# Patient Record
Sex: FEMALE | Race: WHITE | Employment: UNEMPLOYED | URBAN - METROPOLITAN AREA
[De-identification: names, ages, dates, MRNs, and addresses within clinical notes are randomized per-mention and may not be internally consistent; named-entity substitution may affect disease eponyms.]

---

## 2019-06-23 ENCOUNTER — HOSPITAL ENCOUNTER (EMERGENCY)
Age: 34
Discharge: HOME | End: 2019-06-23
Attending: EMERGENCY MEDICINE
Payer: MEDICAID

## 2019-06-23 VITALS
WEIGHT: 230.6 LBS | OXYGEN SATURATION: 100 % | BODY MASS INDEX: 39.37 KG/M2 | HEIGHT: 64 IN | SYSTOLIC BLOOD PRESSURE: 134 MMHG | HEART RATE: 109 BPM | RESPIRATION RATE: 16 BRPM | DIASTOLIC BLOOD PRESSURE: 84 MMHG | TEMPERATURE: 98.5 F

## 2019-06-23 DIAGNOSIS — J02.0 STREP PHARYNGITIS: Primary | ICD-10-CM

## 2019-06-23 LAB — S PYO AG THROAT QL: POSITIVE

## 2019-06-23 PROCEDURE — 99283 EMERGENCY DEPT VISIT LOW MDM: CPT

## 2019-06-23 PROCEDURE — 87880 STREP A ASSAY W/OPTIC: CPT

## 2019-06-23 RX ORDER — AMOXICILLIN 500 MG/1
500 CAPSULE ORAL 3 TIMES DAILY
Qty: 30 CAPSULE | Refills: 0 | Status: SHIPPED | OUTPATIENT
Start: 2019-06-23 | End: 2019-07-03

## 2019-06-23 RX ORDER — PAROXETINE HYDROCHLORIDE 20 MG/1
20 TABLET, FILM COATED ORAL EVERY MORNING
COMMUNITY

## 2019-06-23 RX ORDER — MELOXICAM 7.5 MG/1
7.5 TABLET ORAL 2 TIMES DAILY
COMMUNITY

## 2019-06-23 SDOH — HEALTH STABILITY: MENTAL HEALTH: HOW OFTEN DO YOU HAVE A DRINK CONTAINING ALCOHOL?: NEVER

## 2019-06-23 ASSESSMENT — PAIN SCALES - GENERAL
PAINLEVEL_OUTOF10: 9
PAINLEVEL_OUTOF10: 5

## 2019-06-23 ASSESSMENT — PAIN DESCRIPTION - FREQUENCY: FREQUENCY: CONTINUOUS

## 2019-06-23 ASSESSMENT — PAIN - FUNCTIONAL ASSESSMENT
PAIN_FUNCTIONAL_ASSESSMENT: PREVENTS OR INTERFERES SOME ACTIVE ACTIVITIES AND ADLS
PAIN_FUNCTIONAL_ASSESSMENT: 0-10

## 2019-06-23 ASSESSMENT — PAIN DESCRIPTION - ONSET: ONSET: GRADUAL

## 2019-06-23 ASSESSMENT — PAIN DESCRIPTION - LOCATION: LOCATION: THROAT

## 2019-06-23 ASSESSMENT — PAIN DESCRIPTION - PAIN TYPE: TYPE: ACUTE PAIN

## 2019-06-23 ASSESSMENT — PAIN DESCRIPTION - DESCRIPTORS: DESCRIPTORS: BURNING

## 2019-06-23 ASSESSMENT — PAIN DESCRIPTION - PROGRESSION: CLINICAL_PROGRESSION: GRADUALLY WORSENING

## 2019-06-23 NOTE — ED TRIAGE NOTES
pt presents to ED with a 2 day hx of pharyngitis \"burning\" rated as 9/10/goal is 5/10; pain exacerbated by swallowing/+ cough productive of yellowish phlegm. + left submandibular lympadenopathy palpated. + throat erythema with exudate noted.

## 2019-06-23 NOTE — ED PROVIDER NOTES
[address] [facility]  eMERGENCY dEPARTMENT eNCOUnter      Pt Name: Dai Gill  MRN: [id number]  [place] 1985  Date of evaluation: 6/23/2019  Provider: Dorenda Crigler, [address] [place] [address]       Chief Complaint   Patient presents with    Pharyngitis     pt presents to ED with a 2 day hx of pharyngitis \"burning\" rated as 9/10/goal is 5/10; pain exacerbated by swallowing/+ cough productive of yellowish phlegm         HISTORY OF PRESENT ILLNESS   (Location/Symptom, Timing/Onset, Context/Setting, Quality, Duration, Modifying Factors, Severity)  Note limiting factors. Dai Gill is a 30 y.o. female who presents to the emergency department with complaint of a sore throat that began 2 days ago. She complains of pain with swallowing and a possible fever. She is had a slight cough with some yellow drainage. She denies any headache or neck pain. No chest pain or shortness of breath. No vomiting or diarrhea. No travel history. No exposure to illness. HPI    Nursing Notes were reviewed. REVIEW OF SYSTEMS    (2-9 systems for level 4, 10 or more for level 5)         Eyes: Negative for redness or drainage. Respiratory: Negative for shortness of breath or dyspnea on exertion. Cardiovascular: Negative for chest pain. Gastrointestinal: Negative for abdominal pain. Negative for vomiting or diarrhea. Genitourinary: Negative for flank pain. Negative for dysuria. Negative for hematuria. Neurological: Negative for headache. Musculoskeletal:  Negative edema. All systems are reviewed and are negative except for those listed above in the history of present illness and ROS. PAST MEDICAL HISTORY     Past Medical History:   Diagnosis Date    Anxiety     Arthritis     Depression     OCD (obsessive compulsive disorder)          SURGICAL HISTORY     History reviewed. No pertinent surgical history.       CURRENT MEDICATIONS       Discharge Medication List as of 6/23/2019 distress. Head: No visible evidence of trauma. Normocephalic. Eyes: Pupils equal and reactive. No photophobia. Conjunctiva normal.    HENT: Oral mucosa moist.  Airway patent. Pharynx revealed erythema to the posterior pharynx and anterior Palatino arch. Tonsils were hypertrophied bilaterally but symmetrical.  No abscess. Uvula midline. No exudates noted to both tonsillar pillars. No trismus. Floor the mouth was normal.  Neck:  Soft and supple. No meningeal signs. No lymphadenopathy. Heart:  Regular rate and rhythm. No murmur. Lungs:  Clear to auscultation. No wheezes, rales, or ronchi. No conversational dyspnea or accessory muscle use. Abdomen:  Soft, nondistended, bowel sounds present. Nontender. No guarding rigidity or rebound. No masses. Musculoskeletal: Extremities non-tender with full range of motion. Neurological: Alert and oriented x 3. Speech clear. Cranial nerves II-XII intact. No facial droop. No acute focal motor or sensory deficits. Skin: Skin is warm and dry. No rash. Psychiatric: Normal mood and affect.  Behavior is normal.         DIAGNOSTIC RESULTS     EKG: All EKG's are interpreted by the Emergency Department Physician who either signs or Co-signs this chart in the absence of a cardiologist.        RADIOLOGY:   Non-plain film images such as CT, Ultrasound and MRI are read by the radiologist. Plain radiographic images are visualized and preliminarily interpreted by the emergency physician with the below findings:        Interpretation per the Radiologist below, if available at the time of this note:    No orders to display         ED BEDSIDE ULTRASOUND:   Performed by ED Physician - none    LABS:  Labs Reviewed   STREP SCREEN GROUP A THROAT - Abnormal; Notable for the following components:       Result Value    Rapid Strep A Screen POSITIVE (*)     All other components within normal limits    Narrative:     Performed at:  [address] [facility]  [address] [place], [facility]   Phone [phone]       All other labs were within normal range or not returned as of this dictation. EMERGENCY DEPARTMENT COURSE and DIFFERENTIAL DIAGNOSIS/MDM:   Vitals:    Vitals:    06/23/19 1021 06/23/19 1028   BP: 134/84    Pulse: 109    Resp: 16    Temp: 98.5 °F (36.9 °C)    TempSrc: Oral    SpO2:  100%   Weight: 230 lb 9.6 oz (104.6 kg)    Height: 5' 4\" (1.626 m)          The patient presented with a sore throat as noted above. Strep screen was positive for strep. Clinical exam is consistent with strep pharyngitis and she will be treated with amoxicillin 500 mg 3 times daily for 10 days. She was advised to drink plenty of fluids. She is nontoxic. She is stable for outpatient management. She is able to drink liquids. If her condition worsens or new symptoms develop, she was advised to return immediately to the emergency department. MDM      REASSESSMENT            CRITICAL CARE TIME   Total Critical Care time was 0 minutes, excluding separately reportable procedures. There was a high probability of clinically significant/life threatening deterioration in the patient's condition which required my urgent intervention. CONSULTS:  None    PROCEDURES:  Unless otherwise noted below, none     Procedures        FINAL IMPRESSION      1.  Strep pharyngitis          DISPOSITION/PLAN   DISPOSITION Decision To Discharge 06/23/2019 11:10:17 AM      PATIENT REFERRED TO:  Liliya Armstrong MD  [address] [address]  [phone]    Call today      MD [address] [address]  [phone]    Call         DISCHARGE MEDICATIONS:  Discharge Medication List as of 6/23/2019 11:23 AM      START taking these medications    Details   amoxicillin (AMOXIL) 500 MG capsule Take 1 capsule by mouth 3 times daily for 10 days, Disp-30 capsule, R-0Print           Controlled Substances Monitoring:     No

## 2019-06-23 NOTE — ED NOTES
Discharge and education instructions reviewed. Patient verbalized understanding, teach-back successful. Patient denied questions at this time. No acute distress noted. Patient instructed to follow-up as noted - return to emergency department if symptoms worsen. Patient verbalized understanding. Discharged per EDMD with discharge instructions.          Fortino Rothman RN  06/23/19 4825

## 2023-01-12 LAB
ALBUMIN SERPL-MCNC: 4.1 G/DL (ref 3.5–5)
ALP BLD-CCNC: 70 U/L (ref 38–126)
ALT SERPL-CCNC: 23 U/L (ref 0–34)
AST SERPL-CCNC: 33 U/L (ref 14–36)
BILIRUB SERPL-MCNC: 0.7 MG/DL (ref 0.2–1.3)
BILIRUBIN DIRECT: 0 MG/DL (ref 0–0.3)
BILIRUBIN, INDIRECT: 0.4 MG/DL (ref 0–1.1)
INTERNAL QC: NORMAL
SARS-COV-2, NAA: NEGATIVE
TOTAL PROTEIN: 7.3 G/DL (ref 6.3–8.2)

## 2023-01-16 ENCOUNTER — TELEPHONE (OUTPATIENT)
Dept: SURGERY | Age: 38
End: 2023-01-16

## 2023-01-16 NOTE — TELEPHONE ENCOUNTER
I spoke to patient. We discussed ok to use stool softener, and she was ok to start back on her regular medication as directed by her PCP. She will call back if any other questions or concerns.

## 2023-01-16 NOTE — TELEPHONE ENCOUNTER
PT called wanting to know if she was able to take a stool softener and wants to know if she is able to her water pill and potassium, she states her feet and legs are swelling.   Gallbladder 1/12[facility]

## 2023-03-14 ENCOUNTER — TELEPHONE (OUTPATIENT)
Dept: SURGERY | Age: 38
End: 2023-03-14

## 2023-05-16 NOTE — PROGRESS NOTES
[facility]    CC: Symptomatic macromastia    REFERRING PHYSICIAN: Dr. Saima Mills    HPI: This is a 41 y.o.  female who presents to clinic with desire for breast reduction. She was seen by another plastic surgery group and presents as a second opinion. Her pertinent breast history include the following:    Last Mammogram: None    Current bra size: Unsure (previously \"M cup\")  Desired bra size: As small as possible  Pregnancies/miscarriages: 0 / 0  Breast feeding: no future plans    Breast Symptoms:    Macromastia Symptoms:  Upper back pain: No      Bra strap grooves: Yes      Wears supportive bras (>1 yr): Yes      Tried conservative measures (PT, MDs,etc): Yes (PT, Chiropractor)      Intertrigo: No      Head/neck pain: Yes      Headaches: Yes      Paresthesias of hands/fingers: Yes      PMHx:   Past Medical History:   Diagnosis Date    Anxiety     Arthritis     Depression     OCD (obsessive compulsive disorder)    Arthritis (hips)    PSHx: Lap ema  ALLERGIES: No Known Allergies  SOCIAL: No tobacco, occ ETOH, no IVD  FHx: Past history of breast CA: No   Past family members with breast reduction: No   Past family members with breast augmentation:No    Meds:   Current Outpatient Medications   Medication Sig Dispense Refill    meloxicam (MOBIC) 7.5 MG tablet Take 7.5 mg by mouth 2 times daily      PARoxetine (PAXIL) 20 MG tablet Take 20 mg by mouth every morning       No current facility-administered medications for this visit. ROS   Constitutional: Negative for chills and fever. HENT: Negative for congestion, facial swelling, and voice change. Eyes: Negative for photophobia and visual disturbance. Respiratory: Negative for apnea, cough, chest tightness and shortness of breath. Cardiovascular: Negative for chest pain and palpitations. Gastrointestinal: Negative for dysphagia and early satiety.   Genitourinary: Negative for difficulty urinating, dysuria, flank pain,

## 2023-05-17 ENCOUNTER — OFFICE VISIT (OUTPATIENT)
Dept: SURGERY | Age: 38
End: 2023-05-17
Payer: COMMERCIAL

## 2023-05-17 VITALS
DIASTOLIC BLOOD PRESSURE: 80 MMHG | HEART RATE: 88 BPM | SYSTOLIC BLOOD PRESSURE: 124 MMHG | HEIGHT: 65 IN | OXYGEN SATURATION: 98 % | TEMPERATURE: 97.2 F | WEIGHT: 243 LBS | BODY MASS INDEX: 40.48 KG/M2

## 2023-05-17 DIAGNOSIS — N62 MACROMASTIA: Primary | ICD-10-CM

## 2023-05-17 DIAGNOSIS — E66.01 CLASS 3 SEVERE OBESITY WITH BODY MASS INDEX (BMI) OF 40.0 TO 44.9 IN ADULT, UNSPECIFIED OBESITY TYPE, UNSPECIFIED WHETHER SERIOUS COMORBIDITY PRESENT (HCC): ICD-10-CM

## 2023-05-17 PROCEDURE — 1036F TOBACCO NON-USER: CPT | Performed by: SURGERY

## 2023-05-17 PROCEDURE — G8417 CALC BMI ABV UP PARAM F/U: HCPCS | Performed by: SURGERY

## 2023-05-17 PROCEDURE — 99204 OFFICE O/P NEW MOD 45 MIN: CPT | Performed by: SURGERY

## 2023-05-17 PROCEDURE — G8427 DOCREV CUR MEDS BY ELIG CLIN: HCPCS | Performed by: SURGERY

## 2023-05-17 RX ORDER — IBUPROFEN 800 MG/1
TABLET ORAL
COMMUNITY
Start: 2023-04-10

## 2023-05-17 RX ORDER — PREGABALIN 100 MG/1
CAPSULE ORAL
COMMUNITY
Start: 2023-05-09

## 2023-05-17 RX ORDER — SPIRONOLACTONE 25 MG/1
25 TABLET ORAL DAILY
COMMUNITY
Start: 2023-05-08

## 2023-05-17 RX ORDER — CETIRIZINE HYDROCHLORIDE 10 MG/1
TABLET ORAL
COMMUNITY
Start: 2023-04-10

## 2023-05-17 RX ORDER — POTASSIUM CHLORIDE 750 MG/1
10 TABLET, FILM COATED, EXTENDED RELEASE ORAL EVERY OTHER DAY
COMMUNITY

## 2023-05-17 RX ORDER — CYCLOBENZAPRINE HCL 10 MG
TABLET ORAL
COMMUNITY
Start: 2023-05-08

## 2023-05-17 RX ORDER — SIMETHICONE 80 MG
80 TABLET,CHEWABLE ORAL
COMMUNITY

## 2023-05-17 RX ORDER — HYDROXYZINE HYDROCHLORIDE 10 MG/1
TABLET, FILM COATED ORAL
COMMUNITY
Start: 2023-05-08

## 2023-05-17 RX ORDER — ETODOLAC 400 MG/1
TABLET, FILM COATED ORAL
COMMUNITY
Start: 2023-04-21

## 2023-05-17 RX ORDER — CHOLECALCIFEROL (VITAMIN D3) 125 MCG
CAPSULE ORAL
COMMUNITY
Start: 2023-05-08

## 2023-05-17 RX ORDER — DOCUSATE SODIUM, SENNOSIDES 50; 8.6 MG/1; MG/1
1 TABLET ORAL 2 TIMES DAILY
COMMUNITY
Start: 2023-05-08

## 2023-05-17 RX ORDER — OMEPRAZOLE 20 MG/1
CAPSULE, DELAYED RELEASE ORAL
COMMUNITY
Start: 2023-02-13

## 2023-06-02 ENCOUNTER — TELEPHONE (OUTPATIENT)
Dept: BARIATRICS/WEIGHT MGMT | Age: 38
End: 2023-06-02

## 2023-06-02 NOTE — TELEPHONE ENCOUNTER
Lvm regarding seminar    [facility]    BMI scanned into media

## 2023-07-05 ENCOUNTER — OFFICE VISIT (OUTPATIENT)
Dept: BARIATRICS/WEIGHT MGMT | Age: 38
End: 2023-07-05
Payer: COMMERCIAL

## 2023-07-05 VITALS
WEIGHT: 237 LBS | BODY MASS INDEX: 40.46 KG/M2 | DIASTOLIC BLOOD PRESSURE: 70 MMHG | HEIGHT: 64 IN | SYSTOLIC BLOOD PRESSURE: 106 MMHG

## 2023-07-05 DIAGNOSIS — E66.01 MORBID OBESITY WITH BMI OF 40.0-44.9, ADULT (HCC): Primary | ICD-10-CM

## 2023-07-05 DIAGNOSIS — K21.9 GASTROESOPHAGEAL REFLUX DISEASE, UNSPECIFIED WHETHER ESOPHAGITIS PRESENT: ICD-10-CM

## 2023-07-05 PROCEDURE — 99204 OFFICE O/P NEW MOD 45 MIN: CPT | Performed by: SURGERY

## 2023-07-05 PROCEDURE — G8427 DOCREV CUR MEDS BY ELIG CLIN: HCPCS | Performed by: SURGERY

## 2023-07-05 PROCEDURE — G8417 CALC BMI ABV UP PARAM F/U: HCPCS | Performed by: SURGERY

## 2023-07-05 PROCEDURE — 1036F TOBACCO NON-USER: CPT | Performed by: SURGERY

## 2023-08-09 ENCOUNTER — OFFICE VISIT (OUTPATIENT)
Dept: BARIATRICS/WEIGHT MGMT | Age: 38
End: 2023-08-09
Payer: COMMERCIAL

## 2023-08-09 VITALS — WEIGHT: 234 LBS | BODY MASS INDEX: 39.95 KG/M2 | HEIGHT: 64 IN

## 2023-08-09 DIAGNOSIS — E66.01 MORBID OBESITY WITH BMI OF 40.0-44.9, ADULT (HCC): ICD-10-CM

## 2023-08-09 DIAGNOSIS — K21.9 CHRONIC GERD: ICD-10-CM

## 2023-08-09 DIAGNOSIS — E88.81 METABOLIC SYNDROME: Primary | ICD-10-CM

## 2023-08-09 PROCEDURE — 1036F TOBACCO NON-USER: CPT | Performed by: SURGERY

## 2023-08-09 PROCEDURE — 99214 OFFICE O/P EST MOD 30 MIN: CPT | Performed by: SURGERY

## 2023-08-09 PROCEDURE — G8427 DOCREV CUR MEDS BY ELIG CLIN: HCPCS | Performed by: SURGERY

## 2023-08-09 PROCEDURE — G8417 CALC BMI ABV UP PARAM F/U: HCPCS | Performed by: SURGERY

## 2023-08-09 NOTE — PATIENT INSTRUCTIONS
Patient received dietary handouts and education.     Plan/Recommendations:   - Focus on lean protein 4x/day  - Eliminate soda

## 2023-09-11 ENCOUNTER — HOSPITAL ENCOUNTER (OUTPATIENT)
Age: 38
Setting detail: OUTPATIENT SURGERY
Discharge: HOME | End: 2023-09-11
Attending: SURGERY | Admitting: SURGERY
Payer: COMMERCIAL

## 2023-09-11 ENCOUNTER — ANESTHESIA (OUTPATIENT)
Dept: ENDOSCOPY | Age: 38
End: 2023-09-11
Payer: COMMERCIAL

## 2023-09-11 ENCOUNTER — ANESTHESIA EVENT (OUTPATIENT)
Dept: ENDOSCOPY | Age: 38
End: 2023-09-11
Payer: COMMERCIAL

## 2023-09-11 VITALS
HEIGHT: 64 IN | SYSTOLIC BLOOD PRESSURE: 108 MMHG | WEIGHT: 232 LBS | BODY MASS INDEX: 39.61 KG/M2 | OXYGEN SATURATION: 100 % | RESPIRATION RATE: 18 BRPM | TEMPERATURE: 97.3 F | HEART RATE: 77 BPM | DIASTOLIC BLOOD PRESSURE: 75 MMHG

## 2023-09-11 DIAGNOSIS — Z01.818 PREOPERATIVE CLEARANCE: ICD-10-CM

## 2023-09-11 LAB — HCG UR QL: NEGATIVE

## 2023-09-11 PROCEDURE — 2500000003 HC RX 250 WO HCPCS: Performed by: NURSE ANESTHETIST, CERTIFIED REGISTERED

## 2023-09-11 PROCEDURE — 88305 TISSUE EXAM BY PATHOLOGIST: CPT

## 2023-09-11 PROCEDURE — 3700000000 HC ANESTHESIA ATTENDED CARE: Performed by: SURGERY

## 2023-09-11 PROCEDURE — 2709999900 HC NON-CHARGEABLE SUPPLY: Performed by: SURGERY

## 2023-09-11 PROCEDURE — 7100000010 HC PHASE II RECOVERY - FIRST 15 MIN: Performed by: SURGERY

## 2023-09-11 PROCEDURE — 43239 EGD BIOPSY SINGLE/MULTIPLE: CPT | Performed by: SURGERY

## 2023-09-11 PROCEDURE — 84703 CHORIONIC GONADOTROPIN ASSAY: CPT

## 2023-09-11 PROCEDURE — 3609012400 HC EGD TRANSORAL BIOPSY SINGLE/MULTIPLE: Performed by: SURGERY

## 2023-09-11 PROCEDURE — 7100000011 HC PHASE II RECOVERY - ADDTL 15 MIN: Performed by: SURGERY

## 2023-09-11 PROCEDURE — 6360000002 HC RX W HCPCS: Performed by: NURSE ANESTHETIST, CERTIFIED REGISTERED

## 2023-09-11 PROCEDURE — 88342 IMHCHEM/IMCYTCHM 1ST ANTB: CPT

## 2023-09-11 RX ORDER — LIDOCAINE HYDROCHLORIDE 20 MG/ML
INJECTION, SOLUTION EPIDURAL; INFILTRATION; INTRACAUDAL; PERINEURAL PRN
Status: DISCONTINUED | OUTPATIENT
Start: 2023-09-11 | End: 2023-09-11 | Stop reason: SDUPTHER

## 2023-09-11 RX ORDER — PROPOFOL 10 MG/ML
INJECTION, EMULSION INTRAVENOUS PRN
Status: DISCONTINUED | OUTPATIENT
Start: 2023-09-11 | End: 2023-09-11 | Stop reason: SDUPTHER

## 2023-09-11 RX ORDER — SUCRALFATE 1 G/1
1 TABLET ORAL 3 TIMES DAILY
COMMUNITY
Start: 2023-09-06

## 2023-09-11 RX ORDER — SODIUM CHLORIDE, SODIUM LACTATE, POTASSIUM CHLORIDE, CALCIUM CHLORIDE 600; 310; 30; 20 MG/100ML; MG/100ML; MG/100ML; MG/100ML
INJECTION, SOLUTION INTRAVENOUS CONTINUOUS
Status: DISCONTINUED | OUTPATIENT
Start: 2023-09-11 | End: 2023-09-11 | Stop reason: HOSPADM

## 2023-09-11 RX ADMIN — PROPOFOL 200 MG: 10 INJECTION, EMULSION INTRAVENOUS at 09:35

## 2023-09-11 RX ADMIN — LIDOCAINE HYDROCHLORIDE 5 ML: 20 INJECTION, SOLUTION EPIDURAL; INFILTRATION; INTRACAUDAL; PERINEURAL at 09:35

## 2023-09-11 ASSESSMENT — PAIN SCALES - GENERAL
PAINLEVEL_OUTOF10: 6
PAINLEVEL_OUTOF10: 0

## 2023-09-11 ASSESSMENT — PAIN DESCRIPTION - LOCATION: LOCATION: BACK;NECK

## 2023-09-11 ASSESSMENT — PAIN DESCRIPTION - ONSET: ONSET: ON-GOING

## 2023-09-11 ASSESSMENT — PAIN DESCRIPTION - FREQUENCY: FREQUENCY: CONTINUOUS

## 2023-09-11 ASSESSMENT — PAIN DESCRIPTION - DESCRIPTORS: DESCRIPTORS: ACHING

## 2023-09-11 ASSESSMENT — PAIN DESCRIPTION - ORIENTATION: ORIENTATION: LOWER

## 2023-09-11 ASSESSMENT — PAIN DESCRIPTION - PAIN TYPE: TYPE: CHRONIC PAIN

## 2023-09-11 NOTE — DISCHARGE INSTRUCTIONS
-ENDOSCOPY DISCHARGE INSTRUCTIONS:    Call the physician that did your procedure for any questions or concerns:     DR. ORTEGA:  [phone]       ACTIVITY:    There are potential side effects from the medications used for sedation and anesthesia during your procedure. These include:  Dizziness or light-headedness, confusion or memory loss, delayed reaction times, loss of coordination, nausea and vomiting. Because of your increased risk for injury, we ask that you observe the following precautions: For the next 24 hours,  DO NOT operate an automobile, bicycle, motorcycle, , power tools or large equipment of any kind. Do not drink alcohol, sign any legal documents or make any legal decisions for 24 hours. Do not bend your head over lower than your heart. DO sit on the side of bed/couch awhile before getting up. Plan on bedrest or quiet relaxation today. You may resume normal activities in 24 hours. DIET:    Your first meal today should be light, avoiding spicy and fatty foods. If you tolerate this first meal, then you may advance to your regular diet unless otherwise advised by your physician. NOTIFY YOUR PHYSICIAN IF THESE SYMPTOMS OCCUR:  1. Fever (greater than 100)  5. Increased abdominal bloating  2. Severe pain    6. Excessive bleeding  3. Nausea and vomiting  7. Chest pain                                                                    4. Chills    8. Shortness of breath    NORMAL SYMPTOMS:  1. Mild sore throat  2. Gaseous discomfort                 ADDITIONAL INSTRUCTIONS:  If you are on aspirin and stopped prior to procedure, you may resume aspirin in 24 hours, unless otherwise instructed. Biopsy results: TO BE DISCUSSED AT YOUR NEXT APPOINTMENT TIME    Educational Information:     Follow up appointment:  Ivon Cagle WITH DR. ORTEGA                       Please review these discharge instructions this evening or tomorrow for   information you may have

## 2023-09-11 NOTE — ANESTHESIA POSTPROCEDURE EVALUATION
Department of Anesthesiology  Postprocedure Note    Patient: Christian Balderas  MRN: [id number]  [address] [facility] [place]: 1985  Date of evaluation: 9/11/2023      Procedure Summary     Date: 09/11/23 Room / Location: [address] / The [address] [facility]    Anesthesia Start: 1370 Anesthesia Stop: 6507    Procedure: EGD BIOPSY Diagnosis:       Preoperative clearance      (Preoperative clearance [Z01.818])    Surgeons: Magaly Tyler DO Responsible Provider: Brandi Kovacs MD    Anesthesia Type: MAC ASA Status: 3          Anesthesia Type: No value filed.     Luis Carlos Phase I: Luis Carlos Score: 10    Luis Carlos Phase II: Luis Carlos Score: 10      Anesthesia Post Evaluation    Patient location during evaluation: PACU  Patient participation: complete - patient participated  Level of consciousness: awake  Pain score: 0  Airway patency: patent  Nausea & Vomiting: no nausea  Complications: no  Cardiovascular status: hemodynamically stable  Respiratory status: acceptable  Hydration status: stable  Pain management: satisfactory to patient

## 2023-09-11 NOTE — PROGRESS NOTES
Ambulatory Surgery/Procedure Discharge Note    Vitals:    09/11/23 1005   BP: 108/75   Pulse: 77   Resp: 18   Temp:    SpO2: 100%       No intake/output data recorded. Restroom use offered before discharge. Yes  Pt tolerating PO well and denies nausea. Discharge instructions were read by Leesa over the phone. Refuse toileting. Pain assessment:  none  Pain Level: 0        Patient discharged to home/self care.  Patient discharged via wheel chair by transporter to waiting family/S.O.       9/11/2023 10:38 AM

## 2023-09-11 NOTE — PROGRESS NOTES
Ambulatory Surgery/Procedure Discharge Note    Vitals:    09/11/23 1005   BP: 108/75   Pulse: 77   Resp: 18   Temp:    SpO2: 100%       No intake/output data recorded. Restroom use offered before discharge. Yes    Pain assessment:  none  Pain Level: 0    0945 Arrived in post op endo lying on left side. Shortly after arrival asking for jello and water. Soft non tender abdomen and only pain is when she complains when the BP cuff inflates. 1010 Taking apple sauce because of no jello and sipping water. 1015 Discharge instructions reviewed. Patient and brother in law on phone  educated, using the teach back method, about follow up instructions and discharge instructions. A completed copy of the AVS instructions given to patient and all questions answered. 1015 [facility]  Prior charting was DC'd by another staff member.       9/11/2023 10:22 AM

## 2023-09-13 ENCOUNTER — OFFICE VISIT (OUTPATIENT)
Dept: BARIATRICS/WEIGHT MGMT | Age: 38
End: 2023-09-13
Payer: COMMERCIAL

## 2023-09-13 VITALS — HEIGHT: 64 IN | WEIGHT: 235.6 LBS | BODY MASS INDEX: 40.22 KG/M2

## 2023-09-13 DIAGNOSIS — E88.81 METABOLIC SYNDROME: Primary | ICD-10-CM

## 2023-09-13 DIAGNOSIS — K21.9 CHRONIC GERD: ICD-10-CM

## 2023-09-13 DIAGNOSIS — E66.01 MORBID OBESITY WITH BMI OF 40.0-44.9, ADULT (HCC): ICD-10-CM

## 2023-09-13 PROCEDURE — G8417 CALC BMI ABV UP PARAM F/U: HCPCS | Performed by: SURGERY

## 2023-09-13 PROCEDURE — 1036F TOBACCO NON-USER: CPT | Performed by: SURGERY

## 2023-09-13 PROCEDURE — 99214 OFFICE O/P EST MOD 30 MIN: CPT | Performed by: SURGERY

## 2023-09-13 PROCEDURE — G8427 DOCREV CUR MEDS BY ELIG CLIN: HCPCS | Performed by: SURGERY

## 2023-09-13 NOTE — PROGRESS NOTES
Ariel Burgos gained 1.6 lbs over 1 month. Pt wakes up at 12 or 1 pm.  Pt takes care of her Grandma. Breakfast: none    Snack: none    Lunch: 12 or 1 pm.  [address] with a piece of fruit      Snack: turkey sandwich with 1 piece of wheat bread and half piece of cheese    Dinner: turkey or fish with vegetables-greenbeans or peas or healthy choice  meals        Snack: cottage cheese     Drinks:  water-64 oz/day; decaf unsweetened ice tea with splenda     Is pt consuming smaller portions? Yes     Is pt consuming at least 64 oz of fluids per day?  yes    Is pt consuming carbonated, caffeinated, or sugary beverages? no; pt states that she stopped all soda     Has pt sampled Unjury and/or Nectar protein? yes - tried & tolerated    Has patient attended a support group?  Completed    Exercise: some walking      Plan/Recommendations:   Eat lean protein paired with produce-4 times/day  Eat breakfast when able  Increase non-starchy vegetable intake         Handouts: Frozen meals      Reyes Purpura, RD, LD

## 2023-09-17 NOTE — OP NOTE
Esophagogastroduodenoscopy with biopsy    Indications: Pre-op gastric Surgery, rule out Gastroesophageal reflux disease. Pre-operative Diagnosis: Obesity/pre-op bariatric surgery . Post-operative Diagnosis: Bile Reflux Gastritis, Hiatal Hernia Hill Grade 1    Surgeon: Bryn Rosenthal    Anesthesia: MAC      Procedure Details   The patient was seen in the Holding Room. The risks, benefits, complications, treatment options, and expected outcomes were discussed with the patient. Pre-op Endoscopy recommended to rule any intragastric pathology that would interfere with proposed procedure /  And or due to current conditions. The possibilities of reaction to medication, pulmonary aspiration, perforation of viscus, bleeding, recurrent infection, the need for additional procedures, failure to diagnose a condition, and creating a complication requiring transfusion or operation were discussed with the patient. The patient concurred with the proposed plan, giving informed consent. The site of surgery properly noted/marked. The patient was taken to Endoscopy Suite, identified and the procedure verified as  Esophagogastroduodenoscopy  A Time Out was held and the above information confirmed. Upper Endoscopy: An endoscope was inserted through the oropharynx into the upper esophagus. The endoscope was passed into the stomach to the level of the pylorus and passed to the duodenum where the ampulla was visualized and duodenum was normal. Then the scope was retracted back to the stomach and it was normal except for gastritis, biopsy of the antrum obtained for H. Pylori, then retroflexed and the gastroesophageal junction was inspected.  There was a small hiatal hernia and no evidence of Rangel's     Findings:  Esophageal findings include:  Upper: normal Esophageal Mucosa  Lower:normal Esophageal Mucosa  Distal: normal Esophageal Mucosa      Gastric findings include: abnormal Gastric Mucosa    Duodenal Findings: normal Duodenal

## 2023-10-04 ENCOUNTER — TELEPHONE (OUTPATIENT)
Dept: BARIATRICS/WEIGHT MGMT | Age: 38
End: 2023-10-04

## 2023-10-18 ENCOUNTER — TELEPHONE (OUTPATIENT)
Dept: BARIATRICS/WEIGHT MGMT | Age: 38
End: 2023-10-18

## 2023-10-18 ENCOUNTER — CLINICAL DOCUMENTATION (OUTPATIENT)
Dept: BARIATRICS/WEIGHT MGMT | Age: 38
End: 2023-10-18

## 2023-10-18 NOTE — TELEPHONE ENCOUNTER
Kelly Phillips called scheduled for 10/25 pre-surg visit w Dr. Sahil Linares. Dietician tried to call and she was on a phone call.   Please call patient [phone]

## 2023-10-18 NOTE — PROGRESS NOTES
Raynald Ahumada does not have a new wt to report. This eval was conducted via phone on 10/18/23 in preparation for their pre-surgical visit on 10/25/23 with Dr. Charla Hector. Is pt eating at least 4 times everyday? yes - states eating about every 4-5 hours    Is pt eating a lean protein source with all meals and snacks? yes - CC, turkey, cheese & frozen meals     Has pt decreased their portions using the plate method?  yes    Is pt choosing low fat/sugar free options? yes    Is pt drinking at least 64 oz of clear liquids everyday? yes - water / decaf tea w/ splenda    Has pt stopped drinking carbonation, caffeinated, and sugar sweetened beverages? yes, eliminated soda    Has pt sampled Unjury and/or Nectar protein? yes - tried & tolerated    Has pt attended a support group? Completed    Participating in intentional exercise?   Walking around more often/moving around as able & doing squats on edge of bed     Plan/Recommendations:   - Continue plan    Handouts: none    Kory Pepe RD, LD Юлия POWELL

## 2023-10-25 ENCOUNTER — OFFICE VISIT (OUTPATIENT)
Dept: BARIATRICS/WEIGHT MGMT | Age: 38
End: 2023-10-25
Payer: COMMERCIAL

## 2023-10-25 VITALS — HEIGHT: 64 IN | WEIGHT: 233.6 LBS | BODY MASS INDEX: 39.88 KG/M2

## 2023-10-25 DIAGNOSIS — E66.01 MORBID OBESITY DUE TO EXCESS CALORIES (HCC): ICD-10-CM

## 2023-10-25 DIAGNOSIS — K21.9 CHRONIC GERD: ICD-10-CM

## 2023-10-25 DIAGNOSIS — E88.810 METABOLIC SYNDROME: Primary | ICD-10-CM

## 2023-10-25 PROCEDURE — G8427 DOCREV CUR MEDS BY ELIG CLIN: HCPCS | Performed by: SURGERY

## 2023-10-25 PROCEDURE — G8484 FLU IMMUNIZE NO ADMIN: HCPCS | Performed by: SURGERY

## 2023-10-25 PROCEDURE — 99214 OFFICE O/P EST MOD 30 MIN: CPT | Performed by: SURGERY

## 2023-10-25 PROCEDURE — G8417 CALC BMI ABV UP PARAM F/U: HCPCS | Performed by: SURGERY

## 2023-10-25 PROCEDURE — 1036F TOBACCO NON-USER: CPT | Performed by: SURGERY

## 2023-11-21 ENCOUNTER — CLINICAL DOCUMENTATION (OUTPATIENT)
Dept: BARIATRICS/WEIGHT MGMT | Age: 38
End: 2023-11-21

## 2023-11-21 NOTE — PROGRESS NOTES
Gar Sherrie no wt to report. This eval was conducted via phone on 11/21/23 in preparation or their pre-surgical visit on 11/22/23 with Dr. Minor Dinero. Breakfast: 2 HB eggs  Snack: None  Lunch: CC + turkey lunch meat OR Lean Cuisine   Snack: CC + fruit  Dinner: Healthy Choice (beef merlot) + 1 slice wheat bread   Snack: Protein Bar OR CC + turkey OR none    Is pt consuming smaller portions? Yes focused on protein, using proportioned items, reading labels      Is pt consuming at least 64 oz of fluids per day? At least 64 oz water + diet unsweet tea  + fairlife milk    Is pt consuming carbonated, caffeinated, or sugary beverages? Avoiding, occasionally 5 calorie juice     Has pt sampled Unjury and/or Nectar protein? Yes    Has patient attended a support group?  Completed    Exercise: Walking     Plan/Recommendations:   - Continue current eating patterns including protein and plants with all meals and snacks    Handouts: none    Omkar Colón RD, LD

## 2023-11-22 ENCOUNTER — OFFICE VISIT (OUTPATIENT)
Dept: BARIATRICS/WEIGHT MGMT | Age: 38
End: 2023-11-22
Payer: COMMERCIAL

## 2023-11-22 VITALS — WEIGHT: 240 LBS | HEIGHT: 64 IN | BODY MASS INDEX: 40.97 KG/M2

## 2023-11-22 DIAGNOSIS — E88.810 METABOLIC SYNDROME: Primary | ICD-10-CM

## 2023-11-22 DIAGNOSIS — E66.01 MORBID OBESITY DUE TO EXCESS CALORIES (HCC): ICD-10-CM

## 2023-11-22 DIAGNOSIS — E66.01 MORBID OBESITY WITH BMI OF 40.0-44.9, ADULT (HCC): ICD-10-CM

## 2023-11-22 DIAGNOSIS — K21.9 CHRONIC GERD: ICD-10-CM

## 2023-11-22 PROCEDURE — 99214 OFFICE O/P EST MOD 30 MIN: CPT | Performed by: SURGERY

## 2023-11-22 PROCEDURE — G8484 FLU IMMUNIZE NO ADMIN: HCPCS | Performed by: SURGERY

## 2023-11-22 PROCEDURE — 1036F TOBACCO NON-USER: CPT | Performed by: SURGERY

## 2023-11-22 PROCEDURE — G8427 DOCREV CUR MEDS BY ELIG CLIN: HCPCS | Performed by: SURGERY

## 2023-11-22 PROCEDURE — G8417 CALC BMI ABV UP PARAM F/U: HCPCS | Performed by: SURGERY

## 2023-12-15 ENCOUNTER — HOSPITAL ENCOUNTER (OUTPATIENT)
Age: 38
Discharge: HOME | End: 2023-12-15
Payer: COMMERCIAL

## 2023-12-15 DIAGNOSIS — K21.9 CHRONIC GERD: ICD-10-CM

## 2023-12-15 DIAGNOSIS — E66.01 MORBID OBESITY DUE TO EXCESS CALORIES (HCC): ICD-10-CM

## 2023-12-15 DIAGNOSIS — E88.810 METABOLIC SYNDROME: ICD-10-CM

## 2023-12-15 LAB
AMPHETAMINES UR QL SCN>1000 NG/ML: NORMAL
BARBITURATES UR QL SCN>200 NG/ML: NORMAL
BENZODIAZ UR QL SCN>200 NG/ML: NORMAL
CANNABINOIDS UR QL SCN>50 NG/ML: NORMAL
COCAINE UR QL SCN: NORMAL
DRUG SCREEN COMMENT UR-IMP: NORMAL
ETHANOLAMINE SERPL-MCNC: NORMAL MG/DL (ref 0–0.08)
FENTANYL SCREEN, URINE: NORMAL
METHADONE UR QL SCN>300 NG/ML: NORMAL
OPIATES UR QL SCN>300 NG/ML: NORMAL
OXYCODONE UR QL SCN: NORMAL
PCP UR QL SCN>25 NG/ML: NORMAL
PH UR STRIP: 5 [PH]

## 2023-12-15 PROCEDURE — G0480 DRUG TEST DEF 1-7 CLASSES: HCPCS

## 2023-12-15 PROCEDURE — 80307 DRUG TEST PRSMV CHEM ANLYZR: CPT

## 2023-12-15 PROCEDURE — 82077 ASSAY SPEC XCP UR&BREATH IA: CPT

## 2023-12-15 PROCEDURE — 36415 COLL VENOUS BLD VENIPUNCTURE: CPT

## 2023-12-26 ENCOUNTER — CLINICAL DOCUMENTATION (OUTPATIENT)
Dept: BARIATRICS/WEIGHT MGMT | Age: 38
End: 2023-12-26

## 2023-12-26 NOTE — PROGRESS NOTES
Teressa Flores no wt to report. This eval was conducted via phone on 12/26/23 in preparation or their pre-surgical visit on 12/27/23 with Dr. Pilar Sanz. Breakfast: 2 HB eggs OR oatmeal w/ milk  Snack: None  Lunch: CC + turkey lunch meat OR Lean Cuisine   Snack: CC + fruit  Dinner: [place] breast + green beans, no condiments   Snack: Protein Bar OR CC + turkey OR none    Is pt consuming smaller portions? Yes focused on protein, using proportioned items, reading labels      Is pt consuming at least 64 oz of fluids per day? At least 64 oz water + diet unsweet tea  + fairlife milk    Is pt consuming carbonated, caffeinated, or sugary beverages? Avoiding    Has pt sampled Unjury and/or Nectar protein? Yes    Has patient attended a support group?  Completed    Exercise: Walking     Plan/Recommendations:   - Continue current eating patterns including protein and plants with all meals and snacks    Handouts: none    Trace Napier RD, LD

## 2023-12-27 ENCOUNTER — OFFICE VISIT (OUTPATIENT)
Dept: BARIATRICS/WEIGHT MGMT | Age: 38
End: 2023-12-27
Payer: COMMERCIAL

## 2023-12-27 VITALS — WEIGHT: 231 LBS | HEIGHT: 64 IN | BODY MASS INDEX: 39.44 KG/M2

## 2023-12-27 DIAGNOSIS — E88.810 METABOLIC SYNDROME: Primary | ICD-10-CM

## 2023-12-27 DIAGNOSIS — K44.9 HIATAL HERNIA WITH GERD: ICD-10-CM

## 2023-12-27 DIAGNOSIS — K21.9 HIATAL HERNIA WITH GERD: ICD-10-CM

## 2023-12-27 DIAGNOSIS — K21.9 CHRONIC GERD: ICD-10-CM

## 2023-12-27 DIAGNOSIS — E66.01 MORBID OBESITY WITH BMI OF 40.0-44.9, ADULT (HCC): ICD-10-CM

## 2023-12-27 PROCEDURE — 1036F TOBACCO NON-USER: CPT | Performed by: SURGERY

## 2023-12-27 PROCEDURE — G8427 DOCREV CUR MEDS BY ELIG CLIN: HCPCS | Performed by: SURGERY

## 2023-12-27 PROCEDURE — G8417 CALC BMI ABV UP PARAM F/U: HCPCS | Performed by: SURGERY

## 2023-12-27 PROCEDURE — G8484 FLU IMMUNIZE NO ADMIN: HCPCS | Performed by: SURGERY

## 2023-12-27 PROCEDURE — 99214 OFFICE O/P EST MOD 30 MIN: CPT | Performed by: SURGERY

## 2024-01-08 PROBLEM — K21.9 CHRONIC GERD: Status: ACTIVE | Noted: 2024-01-08

## 2024-01-08 PROBLEM — E66.9 OBESITY (BMI 30-39.9): Status: ACTIVE | Noted: 2024-01-08

## 2024-01-08 NOTE — PROGRESS NOTES
(LODINE) 400 MG tablet, Take 500 mg by mouth 2 times daily, Disp: , Rfl:     hydrOXYzine HCl (ATARAX) 10 MG tablet, TAKE ONE TABLET THREE TIMES A DAY AS NEEDED FOR ITCHING, Disp: , Rfl:     linaclotide (LINZESS) 145 MCG capsule, , Disp: , Rfl:     potassium chloride (KLOR-CON) 10 MEQ extended release tablet, Take 1 tablet by mouth every other day, Disp: , Rfl:     pregabalin (LYRICA) 100 MG capsule, TAKE ONE CAPSULE THREE TIMES A DAY, Disp: , Rfl:     GNP SENNA PLUS 8.6-50 MG per tablet, Take 1 tablet by mouth 2 times daily, Disp: , Rfl:     simethicone (MYLICON) 80 MG chewable tablet, Take 1 tablet by mouth, Disp: , Rfl:     spironolactone (ALDACTONE) 25 MG tablet, Take 1 tablet by mouth daily, Disp: , Rfl:     No results found for: \"WBC\", \"RBC\", \"HGB\", \"HCT\", \"MCV\", \"MCH\", \"MCHC\", \"MPV\", \"NEUTOPHILPCT\", \"LYMPHOPCT\", \"MONOPCT\", \"EOSRELPCT\", \"BASOPCT\", \"NEUTROABS\", \"LYMPHSABS\", \"MONOSABS\", \"EOSABS\"  Lab Results   Component Value Date/Time    PROT 7.3 01/12/2023 07:15 AM    LABALBU 4.1 01/12/2023 07:15 AM    BILITOT 0.7 01/12/2023 07:15 AM    ALKPHOS 70 01/12/2023 07:15 AM    ALT 23 01/12/2023 07:15 AM    AST 33 01/12/2023 07:15 AM     No results found for: \"CHOL\", \"TRIG\", \"HDL\", \"LDLCALC\"  No results found for: \"TSHREFLEX\"  No results found for: \"IRON\", \"TIBC\"  No results found for: \"OGOSKAVU69\", \"FOLATE\"  No results found for: \"VITD25\"  No results found for: \"LABA1C\", \"EAG\"    Review of Systems   Constitutional: Negative.  Negative for chills, fatigue and fever.   HENT: Negative.     Eyes: Negative.    Respiratory: Negative.  Negative for cough and shortness of breath.    Cardiovascular: Negative.    Gastrointestinal: Negative.    Endocrine: Negative.    Genitourinary: Negative.    Musculoskeletal: Negative.    Skin: Negative.    Allergic/Immunologic: Negative.    Neurological: Negative.    Hematological: Negative.    Psychiatric/Behavioral: Negative.         Objective:     Physical Exam  Vitals reviewed.

## 2024-01-29 ENCOUNTER — TELEPHONE (OUTPATIENT)
Dept: BARIATRICS/WEIGHT MGMT | Age: 39
End: 2024-01-29

## 2024-01-29 NOTE — TELEPHONE ENCOUNTER
Reference #: [id number]  Description: Inpatient Elective  Place Of Service:  Inpatient Hospital  Submitting Provider: Justice Lopez  Requesting/Ordering Provider: Reyes Pathak/Osteopath () LISA  Servicing/Rendering Provider:   Facility: [facility]   Member Information  Member Name: Rhona Jose Id: [id number]  YOB: 1985  Gender: Female  Admission Event  Diagnosis Code: E88.81 Metabolic syndrome; E66.01 Morbid (severe) obesity due to excess calories; K44.9 Diaphragmatic hernia without obstruction or gangrene; K21.9 Gastro-esophageal reflux disease without esophagitis  Procedure: 96915 Laparoscopy, surgical, gastric restrictive procedure; longitudinal gastrectomy (ie, sleeve gastrectomy); 18179 Laparoscopy, surgical, repair of paraesophageal hernia, includes fundoplasty, when performed; without implantation of mesh  Line #1  Requested Received Date: 1/29/2024 1:53:13 PM Requested Days: 1  Start Date of Service: 2/27/2024 Authorized Days: 0  End Date of Service: 2/28/2024 Status: Pending

## 2024-01-29 NOTE — PROGRESS NOTES
Patient Name:   Rhona Lockhart      Type of Surgery:  Sleeve         Date of Surgery:  2/27/24       Start Pre-Op Diet On:  2/14/24       Start Clear Liquids On:  2/26/24

## 2024-01-30 ENCOUNTER — OFFICE VISIT (OUTPATIENT)
Dept: BARIATRICS/WEIGHT MGMT | Age: 39
End: 2024-01-30
Payer: COMMERCIAL

## 2024-01-30 VITALS — WEIGHT: 227 LBS | BODY MASS INDEX: 38.76 KG/M2 | HEIGHT: 64 IN

## 2024-01-30 DIAGNOSIS — K21.9 CHRONIC GERD: Primary | ICD-10-CM

## 2024-01-30 DIAGNOSIS — E66.9 OBESITY (BMI 30-39.9): ICD-10-CM

## 2024-01-30 PROCEDURE — 99214 OFFICE O/P EST MOD 30 MIN: CPT | Performed by: NURSE PRACTITIONER

## 2024-01-30 PROCEDURE — 1036F TOBACCO NON-USER: CPT | Performed by: NURSE PRACTITIONER

## 2024-01-30 PROCEDURE — G8484 FLU IMMUNIZE NO ADMIN: HCPCS | Performed by: NURSE PRACTITIONER

## 2024-01-30 PROCEDURE — G8427 DOCREV CUR MEDS BY ELIG CLIN: HCPCS | Performed by: NURSE PRACTITIONER

## 2024-01-30 PROCEDURE — G8417 CALC BMI ABV UP PARAM F/U: HCPCS | Performed by: NURSE PRACTITIONER

## 2024-01-30 RX ORDER — OMEPRAZOLE 40 MG/1
40 CAPSULE, DELAYED RELEASE ORAL DAILY
COMMUNITY
Start: 2024-01-09

## 2024-02-06 ENCOUNTER — PREP FOR PROCEDURE (OUTPATIENT)
Dept: BARIATRICS/WEIGHT MGMT | Age: 39
End: 2024-02-06

## 2024-02-11 RX ORDER — SODIUM CHLORIDE 0.9 % (FLUSH) 0.9 %
5-40 SYRINGE (ML) INJECTION PRN
Status: CANCELLED | OUTPATIENT
Start: 2024-02-11

## 2024-02-11 RX ORDER — SODIUM CHLORIDE 9 MG/ML
INJECTION, SOLUTION INTRAVENOUS CONTINUOUS
Status: CANCELLED | OUTPATIENT
Start: 2024-02-11

## 2024-02-11 RX ORDER — SODIUM CHLORIDE 0.9 % (FLUSH) 0.9 %
5-40 SYRINGE (ML) INJECTION EVERY 12 HOURS SCHEDULED
Status: CANCELLED | OUTPATIENT
Start: 2024-02-11

## 2024-02-11 RX ORDER — SODIUM CHLORIDE 9 MG/ML
INJECTION, SOLUTION INTRAVENOUS PRN
Status: CANCELLED | OUTPATIENT
Start: 2024-02-11

## 2024-02-11 RX ORDER — APREPITANT 40 MG/1
80 CAPSULE ORAL
Status: CANCELLED | OUTPATIENT
Start: 2024-02-27 | End: 2024-02-27

## 2024-02-11 RX ORDER — ACETAMINOPHEN 160 MG/5ML
650 LIQUID ORAL ONCE
Status: CANCELLED | OUTPATIENT
Start: 2024-02-11 | End: 2024-02-11

## 2024-02-11 RX ORDER — ENOXAPARIN SODIUM 100 MG/ML
40 INJECTION SUBCUTANEOUS ONCE
Status: CANCELLED | OUTPATIENT
Start: 2024-02-11 | End: 2024-02-11

## 2024-02-11 RX ORDER — SCOLOPAMINE TRANSDERMAL SYSTEM 1 MG/1
1 PATCH, EXTENDED RELEASE TRANSDERMAL
Status: CANCELLED | OUTPATIENT
Start: 2024-02-11 | End: 2024-02-14

## 2024-02-13 RX ORDER — ENOXAPARIN SODIUM 100 MG/ML
30 INJECTION SUBCUTANEOUS ONCE
Status: CANCELLED | OUTPATIENT
Start: 2024-02-27 | End: 2024-02-13

## 2024-02-13 RX ORDER — PREGABALIN 25 MG/1
150 CAPSULE ORAL ONCE
Status: CANCELLED | OUTPATIENT
Start: 2024-02-27 | End: 2024-02-13

## 2024-02-20 ENCOUNTER — TELEPHONE (OUTPATIENT)
Dept: BARIATRICS/WEIGHT MGMT | Age: 39
End: 2024-02-20

## 2024-02-20 DIAGNOSIS — Z01.818 PREOP TESTING: Primary | ICD-10-CM

## 2024-02-23 ENCOUNTER — HOSPITAL ENCOUNTER (OUTPATIENT)
Age: 39
Discharge: HOME | End: 2024-02-23
Payer: COMMERCIAL

## 2024-02-23 DIAGNOSIS — Z01.818 PREOP TESTING: ICD-10-CM

## 2024-02-23 LAB
ALBUMIN SERPL-MCNC: 3.9 G/DL (ref 3.4–5)
ALBUMIN/GLOB SERPL: 1.1 {RATIO} (ref 1.1–2.2)
ALP SERPL-CCNC: 69 U/L (ref 40–129)
ALT SERPL-CCNC: 31 U/L (ref 10–40)
ANION GAP SERPL CALCULATED.3IONS-SCNC: 8 MMOL/L (ref 3–16)
AST SERPL-CCNC: 20 U/L (ref 15–37)
BILIRUB SERPL-MCNC: 0.3 MG/DL (ref 0–1)
BUN SERPL-MCNC: 14 MG/DL (ref 7–20)
CALCIUM SERPL-MCNC: 9.6 MG/DL (ref 8.3–10.6)
CHLORIDE SERPL-SCNC: 104 MMOL/L (ref 99–110)
CO2 SERPL-SCNC: 27 MMOL/L (ref 21–32)
CREAT SERPL-MCNC: 0.8 MG/DL (ref 0.6–1.1)
DEPRECATED RDW RBC AUTO: 14.7 % (ref 12.4–15.4)
GFR SERPLBLD CREATININE-BSD FMLA CKD-EPI: >60 ML/MIN/{1.73_M2}
GLUCOSE SERPL-MCNC: 108 MG/DL (ref 70–99)
HCT VFR BLD AUTO: 39.4 % (ref 36–48)
HGB BLD-MCNC: 13.5 G/DL (ref 12–16)
MCH RBC QN AUTO: 30 PG (ref 26–34)
MCHC RBC AUTO-ENTMCNC: 34.2 G/DL (ref 31–36)
MCV RBC AUTO: 87.5 FL (ref 80–100)
PLATELET # BLD AUTO: 280 K/UL (ref 135–450)
PMV BLD AUTO: 7.9 FL (ref 5–10.5)
POTASSIUM SERPL-SCNC: 4.3 MMOL/L (ref 3.5–5.1)
PROT SERPL-MCNC: 7.4 G/DL (ref 6.4–8.2)
RBC # BLD AUTO: 4.5 M/UL (ref 4–5.2)
SODIUM SERPL-SCNC: 139 MMOL/L (ref 136–145)
WBC # BLD AUTO: 9.4 K/UL (ref 4–11)

## 2024-02-23 PROCEDURE — 80053 COMPREHEN METABOLIC PANEL: CPT

## 2024-02-23 PROCEDURE — 85027 COMPLETE CBC AUTOMATED: CPT

## 2024-02-23 PROCEDURE — 36415 COLL VENOUS BLD VENIPUNCTURE: CPT

## 2024-02-23 NOTE — PROGRESS NOTES
PRE-OP INSTRUCTIONS FOR SURGICAL PATIENTS          Our Pre-admission Testing Nurses tried and were unable to reach you today.  Please read the attached instructions if you did not listen to your voicemail.     Follow all instructions provided to you from your surgeon's office, including your ARRIVAL TIME.   Arrange for someone to drive you home and be with you for the first 24 hours after discharge.     NOTE: at this time ONLY 2 ADULTS may accompany you   One person encouraged to stay at hospital entire time if outpatient surgery    Enter the MAIN entrance located on [address] and report to the surgical desk on the LEFT side of the lobby. Please park in the parking garage or there is free  Parking available after 7am for your use.    Bring your insurance card & photo ID with you to register.  Bring your medication list with you with dose and frequency listed (including over the counter medications)  Contact your ordering physician/surgeon for medication instructions as soon as possible, especially if taking blood thinners, aspirin, heart, or diabetic medication.  Bariatric surgical patients need to call your surgeon if on diabetic medications (as some may need to be stopped 1-week preop)  A Pre-Surgical History and Physical MUST be completed WITHIN 30 DAYS OR LESS prior to your procedure by your Physician or an Urgent Care.  DO NOT EAT ANYTHING 8 hours prior to arrival for surgery.  You may have sips of WATER ONLY (up to 8 ounces) 4 hours prior to your arrival for surgery. Then nothing further 4 hours prior to arriving at hospital.   NOTE: ALL Gastric, Bariatric & Bowel surgery patients - you MUST follow your surgeon's instructions regarding eating/drinking as you will have very specific instructions to follow.  If you did not receive these, call your surgeon's office immediately.   No gum, candy, mints, or ice chips day of procedure.   Please refrain from drinking alcohol the day before or day of your

## 2024-02-26 ENCOUNTER — TELEPHONE (OUTPATIENT)
Dept: BARIATRICS/WEIGHT MGMT | Age: 39
End: 2024-02-26

## 2024-02-26 NOTE — TELEPHONE ENCOUNTER
Spoke with pt to confirm her 7 am arrival for her 9:30 am surgery on 2/27/24. Pt confirmed completion of pre op diet, following a clear liquid diet today and reminded to be NPO after midnight.

## 2024-02-27 ENCOUNTER — ANESTHESIA EVENT (OUTPATIENT)
Dept: OPERATING ROOM | Age: 39
DRG: 403 | End: 2024-02-27
Payer: COMMERCIAL

## 2024-02-27 ENCOUNTER — ANESTHESIA (OUTPATIENT)
Dept: OPERATING ROOM | Age: 39
DRG: 403 | End: 2024-02-27
Payer: COMMERCIAL

## 2024-02-27 ENCOUNTER — HOSPITAL ENCOUNTER (INPATIENT)
Age: 39
LOS: 1 days | Discharge: HOME | DRG: 403 | End: 2024-02-28
Attending: SURGERY | Admitting: SURGERY
Payer: COMMERCIAL

## 2024-02-27 DIAGNOSIS — K44.9 HIATAL HERNIA: ICD-10-CM

## 2024-02-27 DIAGNOSIS — E66.01 MORBID OBESITY (HCC): ICD-10-CM

## 2024-02-27 DIAGNOSIS — G89.18 ACUTE POSTOPERATIVE PAIN OF ABDOMEN: Primary | ICD-10-CM

## 2024-02-27 DIAGNOSIS — R10.9 ACUTE POSTOPERATIVE PAIN OF ABDOMEN: Primary | ICD-10-CM

## 2024-02-27 PROBLEM — E88.810 METABOLIC SYNDROME: Status: ACTIVE | Noted: 2024-02-27

## 2024-02-27 LAB
ABO + RH BLD: NORMAL
BLD GP AB SCN SERPL QL: NORMAL
GLUCOSE BLD-MCNC: 114 MG/DL (ref 70–99)
GLUCOSE BLD-MCNC: 87 MG/DL (ref 70–99)
HCG UR QL: NEGATIVE
PERFORMED ON: ABNORMAL
PERFORMED ON: NORMAL

## 2024-02-27 PROCEDURE — 6360000002 HC RX W HCPCS: Performed by: SURGERY

## 2024-02-27 PROCEDURE — 2580000003 HC RX 258: Performed by: SURGERY

## 2024-02-27 PROCEDURE — 1200000000 HC SEMI PRIVATE

## 2024-02-27 PROCEDURE — 84703 CHORIONIC GONADOTROPIN ASSAY: CPT

## 2024-02-27 PROCEDURE — 0DB64Z3 EXCISION OF STOMACH, PERCUTANEOUS ENDOSCOPIC APPROACH, VERTICAL: ICD-10-PCS | Performed by: SURGERY

## 2024-02-27 PROCEDURE — 0WQF4ZZ REPAIR ABDOMINAL WALL, PERCUTANEOUS ENDOSCOPIC APPROACH: ICD-10-PCS | Performed by: SURGERY

## 2024-02-27 PROCEDURE — 88307 TISSUE EXAM BY PATHOLOGIST: CPT

## 2024-02-27 PROCEDURE — 2700000000 HC OXYGEN THERAPY PER DAY

## 2024-02-27 PROCEDURE — 3600000019 HC SURGERY ROBOT ADDTL 15MIN: Performed by: SURGERY

## 2024-02-27 PROCEDURE — C9113 INJ PANTOPRAZOLE SODIUM, VIA: HCPCS | Performed by: SURGERY

## 2024-02-27 PROCEDURE — 2720000010 HC SURG SUPPLY STERILE: Performed by: SURGERY

## 2024-02-27 PROCEDURE — 2500000003 HC RX 250 WO HCPCS

## 2024-02-27 PROCEDURE — 2709999900 HC NON-CHARGEABLE SUPPLY: Performed by: SURGERY

## 2024-02-27 PROCEDURE — 8E0W4CZ ROBOTIC ASSISTED PROCEDURE OF TRUNK REGION, PERCUTANEOUS ENDOSCOPIC APPROACH: ICD-10-PCS | Performed by: SURGERY

## 2024-02-27 PROCEDURE — 86900 BLOOD TYPING SEROLOGIC ABO: CPT

## 2024-02-27 PROCEDURE — 94761 N-INVAS EAR/PLS OXIMETRY MLT: CPT

## 2024-02-27 PROCEDURE — 86901 BLOOD TYPING SEROLOGIC RH(D): CPT

## 2024-02-27 PROCEDURE — 3700000001 HC ADD 15 MINUTES (ANESTHESIA): Performed by: SURGERY

## 2024-02-27 PROCEDURE — 6370000000 HC RX 637 (ALT 250 FOR IP): Performed by: SURGERY

## 2024-02-27 PROCEDURE — 0BQT4ZZ REPAIR DIAPHRAGM, PERCUTANEOUS ENDOSCOPIC APPROACH: ICD-10-PCS | Performed by: SURGERY

## 2024-02-27 PROCEDURE — 2580000003 HC RX 258: Performed by: ANESTHESIOLOGY

## 2024-02-27 PROCEDURE — S2900 ROBOTIC SURGICAL SYSTEM: HCPCS | Performed by: SURGERY

## 2024-02-27 PROCEDURE — 86850 RBC ANTIBODY SCREEN: CPT

## 2024-02-27 PROCEDURE — 6360000002 HC RX W HCPCS

## 2024-02-27 PROCEDURE — 3600000009 HC SURGERY ROBOT BASE: Performed by: SURGERY

## 2024-02-27 PROCEDURE — 3700000000 HC ANESTHESIA ATTENDED CARE: Performed by: SURGERY

## 2024-02-27 PROCEDURE — 2580000003 HC RX 258

## 2024-02-27 PROCEDURE — 7100000000 HC PACU RECOVERY - FIRST 15 MIN: Performed by: SURGERY

## 2024-02-27 PROCEDURE — 43281 LAP PARAESOPHAG HERN REPAIR: CPT | Performed by: SURGERY

## 2024-02-27 PROCEDURE — 2500000003 HC RX 250 WO HCPCS: Performed by: SURGERY

## 2024-02-27 PROCEDURE — L0450 TLSO FLEX TRUNK/THOR PRE OTS: HCPCS | Performed by: SURGERY

## 2024-02-27 PROCEDURE — 6360000002 HC RX W HCPCS: Performed by: ANESTHESIOLOGY

## 2024-02-27 PROCEDURE — 43775 LAP SLEEVE GASTRECTOMY: CPT | Performed by: SURGERY

## 2024-02-27 PROCEDURE — 49591 RPR AA HRN 1ST < 3 CM RDC: CPT | Performed by: SURGERY

## 2024-02-27 PROCEDURE — 7100000001 HC PACU RECOVERY - ADDTL 15 MIN: Performed by: SURGERY

## 2024-02-27 RX ORDER — DIPHENHYDRAMINE HYDROCHLORIDE 50 MG/ML
12.5 INJECTION INTRAMUSCULAR; INTRAVENOUS
Status: DISCONTINUED | OUTPATIENT
Start: 2024-02-27 | End: 2024-02-27 | Stop reason: HOSPADM

## 2024-02-27 RX ORDER — ROCURONIUM BROMIDE 10 MG/ML
INJECTION, SOLUTION INTRAVENOUS PRN
Status: DISCONTINUED | OUTPATIENT
Start: 2024-02-27 | End: 2024-02-27 | Stop reason: SDUPTHER

## 2024-02-27 RX ORDER — ONDANSETRON 2 MG/ML
4 INJECTION INTRAMUSCULAR; INTRAVENOUS EVERY 6 HOURS PRN
Status: DISCONTINUED | OUTPATIENT
Start: 2024-02-27 | End: 2024-02-28 | Stop reason: HOSPADM

## 2024-02-27 RX ORDER — ONDANSETRON 4 MG/1
4 TABLET, ORALLY DISINTEGRATING ORAL EVERY 8 HOURS PRN
Status: DISCONTINUED | OUTPATIENT
Start: 2024-02-27 | End: 2024-02-28 | Stop reason: HOSPADM

## 2024-02-27 RX ORDER — LORAZEPAM 2 MG/ML
0.5 INJECTION INTRAMUSCULAR
Status: DISCONTINUED | OUTPATIENT
Start: 2024-02-27 | End: 2024-02-27 | Stop reason: HOSPADM

## 2024-02-27 RX ORDER — SODIUM CHLORIDE 9 MG/ML
INJECTION, SOLUTION INTRAVENOUS CONTINUOUS
Status: DISCONTINUED | OUTPATIENT
Start: 2024-02-27 | End: 2024-02-27 | Stop reason: HOSPADM

## 2024-02-27 RX ORDER — PREGABALIN 150 MG/1
150 CAPSULE ORAL ONCE
Status: COMPLETED | OUTPATIENT
Start: 2024-02-27 | End: 2024-02-27

## 2024-02-27 RX ORDER — DIPHENHYDRAMINE HYDROCHLORIDE 50 MG/ML
25 INJECTION INTRAMUSCULAR; INTRAVENOUS NIGHTLY PRN
Status: DISCONTINUED | OUTPATIENT
Start: 2024-02-27 | End: 2024-02-28

## 2024-02-27 RX ORDER — SODIUM CHLORIDE 9 MG/ML
INJECTION, SOLUTION INTRAVENOUS PRN
Status: DISCONTINUED | OUTPATIENT
Start: 2024-02-27 | End: 2024-02-27 | Stop reason: HOSPADM

## 2024-02-27 RX ORDER — ENOXAPARIN SODIUM 100 MG/ML
30 INJECTION SUBCUTANEOUS ONCE
Status: COMPLETED | OUTPATIENT
Start: 2024-02-27 | End: 2024-02-27

## 2024-02-27 RX ORDER — METOCLOPRAMIDE HYDROCHLORIDE 5 MG/ML
10 INJECTION INTRAMUSCULAR; INTRAVENOUS EVERY 6 HOURS PRN
Status: DISCONTINUED | OUTPATIENT
Start: 2024-02-27 | End: 2024-02-28 | Stop reason: HOSPADM

## 2024-02-27 RX ORDER — PROCHLORPERAZINE EDISYLATE 5 MG/ML
5 INJECTION INTRAMUSCULAR; INTRAVENOUS
Status: DISCONTINUED | OUTPATIENT
Start: 2024-02-27 | End: 2024-02-27 | Stop reason: HOSPADM

## 2024-02-27 RX ORDER — SODIUM CHLORIDE 0.9 % (FLUSH) 0.9 %
5-40 SYRINGE (ML) INJECTION PRN
Status: DISCONTINUED | OUTPATIENT
Start: 2024-02-27 | End: 2024-02-28 | Stop reason: HOSPADM

## 2024-02-27 RX ORDER — ACETAMINOPHEN 160 MG/5ML
650 LIQUID ORAL ONCE
Status: COMPLETED | OUTPATIENT
Start: 2024-02-27 | End: 2024-02-27

## 2024-02-27 RX ORDER — SCOLOPAMINE TRANSDERMAL SYSTEM 1 MG/1
1 PATCH, EXTENDED RELEASE TRANSDERMAL
Status: DISCONTINUED | OUTPATIENT
Start: 2024-02-27 | End: 2024-02-28 | Stop reason: HOSPADM

## 2024-02-27 RX ORDER — ONDANSETRON 2 MG/ML
4 INJECTION INTRAMUSCULAR; INTRAVENOUS
Status: DISCONTINUED | OUTPATIENT
Start: 2024-02-27 | End: 2024-02-27 | Stop reason: HOSPADM

## 2024-02-27 RX ORDER — MAGNESIUM HYDROXIDE 1200 MG/15ML
LIQUID ORAL PRN
Status: DISCONTINUED | OUTPATIENT
Start: 2024-02-27 | End: 2024-02-27 | Stop reason: HOSPADM

## 2024-02-27 RX ORDER — ENALAPRILAT 1.25 MG/ML
1.25 INJECTION INTRAVENOUS EVERY 6 HOURS PRN
Status: DISCONTINUED | OUTPATIENT
Start: 2024-02-27 | End: 2024-02-28 | Stop reason: HOSPADM

## 2024-02-27 RX ORDER — ENOXAPARIN SODIUM 100 MG/ML
40 INJECTION SUBCUTANEOUS ONCE
Status: DISCONTINUED | OUTPATIENT
Start: 2024-02-27 | End: 2024-02-27 | Stop reason: SDUPTHER

## 2024-02-27 RX ORDER — ACETAMINOPHEN 500 MG
1000 TABLET ORAL EVERY 6 HOURS PRN
COMMUNITY

## 2024-02-27 RX ORDER — MIDAZOLAM HYDROCHLORIDE 1 MG/ML
INJECTION INTRAMUSCULAR; INTRAVENOUS PRN
Status: DISCONTINUED | OUTPATIENT
Start: 2024-02-27 | End: 2024-02-27 | Stop reason: SDUPTHER

## 2024-02-27 RX ORDER — BUPIVACAINE HYDROCHLORIDE 5 MG/ML
INJECTION, SOLUTION EPIDURAL; INTRACAUDAL PRN
Status: DISCONTINUED | OUTPATIENT
Start: 2024-02-27 | End: 2024-02-27 | Stop reason: HOSPADM

## 2024-02-27 RX ORDER — SODIUM CHLORIDE 0.9 % (FLUSH) 0.9 %
5-40 SYRINGE (ML) INJECTION EVERY 12 HOURS SCHEDULED
Status: DISCONTINUED | OUTPATIENT
Start: 2024-02-27 | End: 2024-02-28 | Stop reason: HOSPADM

## 2024-02-27 RX ORDER — SODIUM CHLORIDE, SODIUM LACTATE, POTASSIUM CHLORIDE, CALCIUM CHLORIDE 600; 310; 30; 20 MG/100ML; MG/100ML; MG/100ML; MG/100ML
INJECTION, SOLUTION INTRAVENOUS CONTINUOUS
Status: DISCONTINUED | OUTPATIENT
Start: 2024-02-27 | End: 2024-02-27 | Stop reason: HOSPADM

## 2024-02-27 RX ORDER — FENTANYL CITRATE 50 UG/ML
INJECTION, SOLUTION INTRAMUSCULAR; INTRAVENOUS PRN
Status: DISCONTINUED | OUTPATIENT
Start: 2024-02-27 | End: 2024-02-27 | Stop reason: SDUPTHER

## 2024-02-27 RX ORDER — SODIUM CHLORIDE 0.9 % (FLUSH) 0.9 %
5-40 SYRINGE (ML) INJECTION PRN
Status: DISCONTINUED | OUTPATIENT
Start: 2024-02-27 | End: 2024-02-27 | Stop reason: HOSPADM

## 2024-02-27 RX ORDER — ONDANSETRON 2 MG/ML
INJECTION INTRAMUSCULAR; INTRAVENOUS PRN
Status: DISCONTINUED | OUTPATIENT
Start: 2024-02-27 | End: 2024-02-27 | Stop reason: SDUPTHER

## 2024-02-27 RX ORDER — ENOXAPARIN SODIUM 100 MG/ML
30 INJECTION SUBCUTANEOUS EVERY 12 HOURS SCHEDULED
Status: DISCONTINUED | OUTPATIENT
Start: 2024-02-27 | End: 2024-02-28 | Stop reason: HOSPADM

## 2024-02-27 RX ORDER — SODIUM CHLORIDE 0.9 % (FLUSH) 0.9 %
5-40 SYRINGE (ML) INJECTION EVERY 12 HOURS SCHEDULED
Status: DISCONTINUED | OUTPATIENT
Start: 2024-02-27 | End: 2024-02-27 | Stop reason: HOSPADM

## 2024-02-27 RX ORDER — SODIUM CHLORIDE, SODIUM LACTATE, POTASSIUM CHLORIDE, AND CALCIUM CHLORIDE .6; .31; .03; .02 G/100ML; G/100ML; G/100ML; G/100ML
IRRIGANT IRRIGATION PRN
Status: DISCONTINUED | OUTPATIENT
Start: 2024-02-27 | End: 2024-02-27 | Stop reason: HOSPADM

## 2024-02-27 RX ORDER — LIDOCAINE HYDROCHLORIDE 20 MG/ML
INJECTION, SOLUTION INTRAVENOUS PRN
Status: DISCONTINUED | OUTPATIENT
Start: 2024-02-27 | End: 2024-02-27 | Stop reason: SDUPTHER

## 2024-02-27 RX ORDER — PROPOFOL 10 MG/ML
INJECTION, EMULSION INTRAVENOUS PRN
Status: DISCONTINUED | OUTPATIENT
Start: 2024-02-27 | End: 2024-02-27 | Stop reason: SDUPTHER

## 2024-02-27 RX ORDER — HYOSCYAMINE SULFATE 0.5 MG/ML
250 INJECTION, SOLUTION SUBCUTANEOUS EVERY 4 HOURS PRN
Status: DISCONTINUED | OUTPATIENT
Start: 2024-02-27 | End: 2024-02-28 | Stop reason: HOSPADM

## 2024-02-27 RX ORDER — IPRATROPIUM BROMIDE AND ALBUTEROL SULFATE 2.5; .5 MG/3ML; MG/3ML
1 SOLUTION RESPIRATORY (INHALATION)
Status: DISCONTINUED | OUTPATIENT
Start: 2024-02-27 | End: 2024-02-27 | Stop reason: HOSPADM

## 2024-02-27 RX ORDER — PHENYLEPHRINE HYDROCHLORIDE 10 MG/ML
INJECTION INTRAVENOUS PRN
Status: DISCONTINUED | OUTPATIENT
Start: 2024-02-27 | End: 2024-02-27 | Stop reason: SDUPTHER

## 2024-02-27 RX ORDER — HYDROMORPHONE HYDROCHLORIDE 1 MG/ML
0.5 INJECTION, SOLUTION INTRAMUSCULAR; INTRAVENOUS; SUBCUTANEOUS EVERY 5 MIN PRN
Status: DISCONTINUED | OUTPATIENT
Start: 2024-02-27 | End: 2024-02-27 | Stop reason: HOSPADM

## 2024-02-27 RX ORDER — MEPERIDINE HYDROCHLORIDE 25 MG/ML
12.5 INJECTION INTRAMUSCULAR; INTRAVENOUS; SUBCUTANEOUS EVERY 5 MIN PRN
Status: DISCONTINUED | OUTPATIENT
Start: 2024-02-27 | End: 2024-02-27 | Stop reason: HOSPADM

## 2024-02-27 RX ORDER — APREPITANT 40 MG/1
80 CAPSULE ORAL
Status: COMPLETED | OUTPATIENT
Start: 2024-02-27 | End: 2024-02-27

## 2024-02-27 RX ORDER — SODIUM CHLORIDE 9 MG/ML
INJECTION, SOLUTION INTRAVENOUS PRN
Status: DISCONTINUED | OUTPATIENT
Start: 2024-02-27 | End: 2024-02-28 | Stop reason: HOSPADM

## 2024-02-27 RX ORDER — LABETALOL HYDROCHLORIDE 5 MG/ML
10 INJECTION, SOLUTION INTRAVENOUS
Status: DISCONTINUED | OUTPATIENT
Start: 2024-02-27 | End: 2024-02-27 | Stop reason: HOSPADM

## 2024-02-27 RX ORDER — SODIUM CHLORIDE 9 MG/ML
INJECTION, SOLUTION INTRAVENOUS CONTINUOUS
Status: DISCONTINUED | OUTPATIENT
Start: 2024-02-27 | End: 2024-02-28 | Stop reason: HOSPADM

## 2024-02-27 RX ORDER — FENTANYL CITRATE 50 UG/ML
25 INJECTION, SOLUTION INTRAMUSCULAR; INTRAVENOUS EVERY 5 MIN PRN
Status: DISCONTINUED | OUTPATIENT
Start: 2024-02-27 | End: 2024-02-27 | Stop reason: HOSPADM

## 2024-02-27 RX ORDER — NALOXONE HYDROCHLORIDE 0.4 MG/ML
INJECTION, SOLUTION INTRAMUSCULAR; INTRAVENOUS; SUBCUTANEOUS PRN
Status: DISCONTINUED | OUTPATIENT
Start: 2024-02-27 | End: 2024-02-28

## 2024-02-27 RX ADMIN — SODIUM CHLORIDE, PRESERVATIVE FREE 10 ML: 5 INJECTION INTRAVENOUS at 20:20

## 2024-02-27 RX ADMIN — SODIUM CHLORIDE, POTASSIUM CHLORIDE, SODIUM LACTATE AND CALCIUM CHLORIDE: 600; 310; 30; 20 INJECTION, SOLUTION INTRAVENOUS at 08:34

## 2024-02-27 RX ADMIN — MIDAZOLAM HYDROCHLORIDE 2 MG: 2 INJECTION, SOLUTION INTRAMUSCULAR; INTRAVENOUS at 09:20

## 2024-02-27 RX ADMIN — PHENYLEPHRINE HYDROCHLORIDE 100 MCG: 10 INJECTION, SOLUTION INTRAVENOUS at 10:31

## 2024-02-27 RX ADMIN — PHENYLEPHRINE HYDROCHLORIDE 100 MCG: 10 INJECTION, SOLUTION INTRAVENOUS at 10:12

## 2024-02-27 RX ADMIN — HYOSCYAMINE SULFATE 250 MCG: 0.5 INJECTION, SOLUTION SUBCUTANEOUS at 16:58

## 2024-02-27 RX ADMIN — FENTANYL CITRATE 100 MCG: 50 INJECTION, SOLUTION INTRAMUSCULAR; INTRAVENOUS at 09:30

## 2024-02-27 RX ADMIN — FENTANYL CITRATE 50 MCG: 50 INJECTION, SOLUTION INTRAMUSCULAR; INTRAVENOUS at 10:00

## 2024-02-27 RX ADMIN — METHOCARBAMOL 500 MG: 100 INJECTION INTRAMUSCULAR; INTRAVENOUS at 13:50

## 2024-02-27 RX ADMIN — Medication: at 12:48

## 2024-02-27 RX ADMIN — SODIUM CHLORIDE: 9 INJECTION, SOLUTION INTRAVENOUS at 14:23

## 2024-02-27 RX ADMIN — SUGAMMADEX 100 MG: 100 INJECTION, SOLUTION INTRAVENOUS at 11:34

## 2024-02-27 RX ADMIN — SODIUM CHLORIDE, POTASSIUM CHLORIDE, SODIUM LACTATE AND CALCIUM CHLORIDE: 600; 310; 30; 20 INJECTION, SOLUTION INTRAVENOUS at 10:07

## 2024-02-27 RX ADMIN — DEXMEDETOMIDINE HYDROCHLORIDE 4 MCG: 100 INJECTION, SOLUTION INTRAVENOUS at 11:08

## 2024-02-27 RX ADMIN — PREGABALIN 150 MG: 150 CAPSULE ORAL at 08:15

## 2024-02-27 RX ADMIN — APREPITANT 80 MG: 40 CAPSULE ORAL at 08:17

## 2024-02-27 RX ADMIN — ONDANSETRON 4 MG: 2 INJECTION INTRAMUSCULAR; INTRAVENOUS at 09:45

## 2024-02-27 RX ADMIN — SODIUM CHLORIDE: 9 INJECTION, SOLUTION INTRAVENOUS at 21:38

## 2024-02-27 RX ADMIN — SODIUM CHLORIDE, POTASSIUM CHLORIDE, SODIUM LACTATE AND CALCIUM CHLORIDE: 600; 310; 30; 20 INJECTION, SOLUTION INTRAVENOUS at 11:34

## 2024-02-27 RX ADMIN — ACETAMINOPHEN 650 MG: 650 SOLUTION ORAL at 08:14

## 2024-02-27 RX ADMIN — ROCURONIUM BROMIDE 50 MG: 10 INJECTION, SOLUTION INTRAVENOUS at 09:32

## 2024-02-27 RX ADMIN — SODIUM CHLORIDE 2000 MG: 900 INJECTION INTRAVENOUS at 09:37

## 2024-02-27 RX ADMIN — DEXMEDETOMIDINE HYDROCHLORIDE 4 MCG: 100 INJECTION, SOLUTION INTRAVENOUS at 10:53

## 2024-02-27 RX ADMIN — PROPOFOL 50 MG: 10 INJECTION, EMULSION INTRAVENOUS at 10:00

## 2024-02-27 RX ADMIN — PROPOFOL 150 MG: 10 INJECTION, EMULSION INTRAVENOUS at 09:29

## 2024-02-27 RX ADMIN — DEXMEDETOMIDINE HYDROCHLORIDE 4 MCG: 100 INJECTION, SOLUTION INTRAVENOUS at 10:44

## 2024-02-27 RX ADMIN — PROPOFOL 50 MG: 10 INJECTION, EMULSION INTRAVENOUS at 09:31

## 2024-02-27 RX ADMIN — FENTANYL CITRATE 50 MCG: 50 INJECTION, SOLUTION INTRAMUSCULAR; INTRAVENOUS at 11:42

## 2024-02-27 RX ADMIN — DEXMEDETOMIDINE HYDROCHLORIDE 4 MCG: 100 INJECTION, SOLUTION INTRAVENOUS at 09:57

## 2024-02-27 RX ADMIN — LIDOCAINE HYDROCHLORIDE 60 MG: 20 INJECTION, SOLUTION INTRAVENOUS at 09:28

## 2024-02-27 RX ADMIN — SUGAMMADEX 100 MG: 100 INJECTION, SOLUTION INTRAVENOUS at 11:33

## 2024-02-27 RX ADMIN — PHENYLEPHRINE HYDROCHLORIDE 100 MCG: 10 INJECTION, SOLUTION INTRAVENOUS at 10:29

## 2024-02-27 RX ADMIN — SODIUM CHLORIDE, PRESERVATIVE FREE 40 MG: 5 INJECTION INTRAVENOUS at 16:57

## 2024-02-27 RX ADMIN — ROCURONIUM BROMIDE 30 MG: 10 INJECTION, SOLUTION INTRAVENOUS at 09:59

## 2024-02-27 RX ADMIN — ENOXAPARIN SODIUM 30 MG: 100 INJECTION SUBCUTANEOUS at 08:34

## 2024-02-27 RX ADMIN — PHENYLEPHRINE HYDROCHLORIDE 100 MCG: 10 INJECTION, SOLUTION INTRAVENOUS at 10:04

## 2024-02-27 RX ADMIN — ENOXAPARIN SODIUM 30 MG: 100 INJECTION SUBCUTANEOUS at 23:02

## 2024-02-27 RX ADMIN — FENTANYL CITRATE 25 MCG: 50 INJECTION INTRAMUSCULAR; INTRAVENOUS at 13:59

## 2024-02-27 ASSESSMENT — PAIN DESCRIPTION - FREQUENCY
FREQUENCY: CONTINUOUS

## 2024-02-27 ASSESSMENT — PAIN DESCRIPTION - ONSET
ONSET: ON-GOING

## 2024-02-27 ASSESSMENT — PAIN DESCRIPTION - LOCATION
LOCATION: ABDOMEN
LOCATION: NECK;BACK
LOCATION: ABDOMEN
LOCATION: ABDOMEN

## 2024-02-27 ASSESSMENT — PAIN DESCRIPTION - DESCRIPTORS
DESCRIPTORS: SHARP
DESCRIPTORS: SHARP;PRESSURE;STABBING
DESCRIPTORS: ACHING
DESCRIPTORS: SHARP

## 2024-02-27 ASSESSMENT — PAIN SCALES - GENERAL
PAINLEVEL_OUTOF10: 0
PAINLEVEL_OUTOF10: 2
PAINLEVEL_OUTOF10: 5
PAINLEVEL_OUTOF10: 2
PAINLEVEL_OUTOF10: 2
PAINLEVEL_OUTOF10: 0
PAINLEVEL_OUTOF10: 2
PAINLEVEL_OUTOF10: 7
PAINLEVEL_OUTOF10: 7
PAINLEVEL_OUTOF10: 9
PAINLEVEL_OUTOF10: 3
PAINLEVEL_OUTOF10: 10
PAINLEVEL_OUTOF10: 2
PAINLEVEL_OUTOF10: 6

## 2024-02-27 ASSESSMENT — PAIN - FUNCTIONAL ASSESSMENT
PAIN_FUNCTIONAL_ASSESSMENT: ACTIVITIES ARE NOT PREVENTED
PAIN_FUNCTIONAL_ASSESSMENT: ACTIVITIES ARE NOT PREVENTED
PAIN_FUNCTIONAL_ASSESSMENT: PREVENTS OR INTERFERES SOME ACTIVE ACTIVITIES AND ADLS
PAIN_FUNCTIONAL_ASSESSMENT: ACTIVITIES ARE NOT PREVENTED

## 2024-02-27 ASSESSMENT — PAIN DESCRIPTION - ORIENTATION
ORIENTATION: RIGHT;LEFT
ORIENTATION: MID;UPPER
ORIENTATION: RIGHT;LEFT;MID
ORIENTATION: POSTERIOR;LOWER
ORIENTATION: RIGHT;LEFT
ORIENTATION: MID;UPPER
ORIENTATION: ANTERIOR

## 2024-02-27 ASSESSMENT — PAIN DESCRIPTION - PAIN TYPE
TYPE: SURGICAL PAIN
TYPE: CHRONIC PAIN

## 2024-02-27 NOTE — PROGRESS NOTES
4 Eyes Skin Assessment     NAME:  Rhona Lockhart  YOB: 1985  MEDICAL RECORD NUMBER:  [id number]    The patient is being assessed for  Admission    I agree that at least one RN has performed a thorough Head to Toe Skin Assessment on the patient. ALL assessment sites listed below have been assessed.      Areas assessed by both nurses:    Head, Face, Ears, Shoulders, Back, Chest, Arms, Elbows, Hands, Sacrum. Buttock, Coccyx, Ischium, Legs. Feet and Heels, and Under Medical Devices    Abd surgical wounds     Does the Patient have a Wound? No noted wound(s)       Dharmesh Prevention initiated by RN: No  Wound Care Orders initiated by RN: No    Pressure Injury (Stage 3,4, Unstageable, DTI, NWPT, and Complex wounds) if present, place Wound referral order by RN under : No    New Ostomies, if present place, Ostomy referral order under : No     Nurse 1 eSignature: Electronically signed by Edelmira Stallings RN on 2/27/24 at 5:05 PM EST    **SHARE this note so that the co-signing nurse can place an eSignature**    Nurse 2 eSignature: Electronically signed by Wanda Marley RN on 2/27/24 at 5:21 PM EST

## 2024-02-27 NOTE — BRIEF OP NOTE
Brief Postoperative Note      Patient: Rhona Lockhart  YOB: 1985  MRN: [id number]    Date of Procedure: 2/27/2024    Pre-Op Diagnosis Codes:     * Morbid obesity (HCC) [E66.01]     * Hiatal hernia [K44.9]    Post-Op Diagnosis: Same       Procedure(s):  ROBOTIC ASSISTED LAPAROSCOPIC SLEEVE GASTRECTOMY, HIATAL HERNIA REPAIR, LAPAROSCOPIC VENTRAL HERNIA REPAIR    Surgeon(s):  Justice Lopez DO    Assistant:  Surgical Assistant: Joseph Wynne    Anesthesia: General    Estimated Blood Loss (mL): Minimal    Complications: None    Specimens:   ID Type Source Tests Collected by Time Destination   A : portion of stomach Tissue Tissue SURGICAL PATHOLOGY Justice Lopez DO 2/27/2024 0957        Implants:  * No implants in log *      Drains: * No LDAs found *    Findings: sleeve no leak      Electronically signed by Justice Lopez DO on 2/27/2024 at 11:41 AM

## 2024-02-27 NOTE — PROGRESS NOTES
Patient transported to room 5317 from PACU. Patient is A&O x 4. VSS. Patient oriented to the room all safety measures in place. Patient given IS and SCDs at this time. Admission orders released and patient 4 eyes completed. Admission documentation completed. No other needs are noted at this time.    [x] Bed alarm on and cord plugged into wall  [x] Bed in lowest position  [x] Call light and bedside table within reach  [x] Patient educated on all safety measures  [x]Oxygen connected to wall (if applicable)     Nurse 1 Esignature: Electronically signed by Edelmira Stallings RN on 2/27/24 at 5:07 PM EST  Nurse 2 Esignature: Electronically signed by Wanda Marley RN on 2/27/24 at 5:20 PM EST

## 2024-02-27 NOTE — PROGRESS NOTES
Placed in clinical discharge, assessments changed to every 2 hours and vitals every 30 minutes, pt remains sleepy

## 2024-02-27 NOTE — PROGRESS NOTES
Patient admitted to PACU # 09 from OR at 1145 post ROBOTIC ASSISTED LAPAROSCOPIC SLEEVE GASTRECTOMY, HIATAL HERNIA REPAIR, LAPAROSCOPIC VENTRAL HERNIA REPAIR  per Dr. Lopez.  Attached to PACU monitoring system and report received from anesthesia provider.  Patient was reported to be hemodynamically stable during procedure.  Patient drowsy on admission and denied pain.

## 2024-02-27 NOTE — ANESTHESIA POSTPROCEDURE EVALUATION
Department of Anesthesiology  Postprocedure Note    Patient: Rhona Lockhart  MRN: [id number]  YOB: 1985  Date of evaluation: 2/27/2024    Procedure Summary       Date: 02/27/24 Room / Location: [place] [address] / [facility]    Anesthesia Start: 0922 Anesthesia Stop: 1148    Procedure: ROBOTIC ASSISTED LAPAROSCOPIC SLEEVE GASTRECTOMY, HIATAL HERNIA REPAIR, LAPAROSCOPIC VENTRAL HERNIA REPAIR (Abdomen) Diagnosis:       Morbid obesity (HCC)      Hiatal hernia      (Morbid obesity (HCC) [E66.01])      (Hiatal hernia [K44.9])    Surgeons: Justice Lopez DO Responsible Provider: Eugenio Davis MD    Anesthesia Type: general ASA Status: 3            Anesthesia Type: No value filed.    Luis Carlos Phase I: Luis Carlos Score: 8    Luis Carlos Phase II:      Anesthesia Post Evaluation    Patient location during evaluation: PACU  Patient participation: complete - patient participated  Level of consciousness: awake  Airway patency: patent  Nausea & Vomiting: no nausea and no vomiting  Cardiovascular status: blood pressure returned to baseline and hemodynamically stable  Respiratory status: acceptable  Hydration status: euvolemic  Multimodal analgesia pain management approach  Pain management: adequate    No notable events documented.

## 2024-02-27 NOTE — PROGRESS NOTES
PACU Transfer Note    Vitals:    02/27/24 1500   BP: 122/78   Pulse: 94   Resp: 18   Temp: 97.6 °F (36.4 °C)   SpO2: 93%       In: 2430 [I.V.:2380]  Out: 20     Pain assessment:  sent to room with RN and transporter, belongings taken tto room with tablet and cords  Pain Level: 6    Report given to Receiving unit RN.    2/27/2024 3:08 PM

## 2024-02-27 NOTE — DISCHARGE INSTRUCTIONS
Bariatric Surgery Discharge Instructions    You will be consuming clear liquids on Wednesday and Thursday. You need to drink at least 48-64oz of fluid daily for hydration.  This will mean sipping fluids all day to prevent dehydration and the possibility of needing IV fluids for rehydration!    Wednesday & Thursday    Fluid Intake: Minimum of 48 oz of fluid each day, more as you are able.    Sip fluids slowly      All of the fluids you consume from now on will be non-carbonated, caffeine free and sugar-free. Drinks should contain 5 grams of sugar or less per 8 oz. serving.    Refer to Clear Liquids List for a list of acceptable clear liquids.      Friday  you can begin Protein drinks.   Your first priority is getting adequate fluid intake.  If you can do this, you can try a protein drink.  That is priority number two.   If you can get the protein drink in then you can try full liquids/pureed/blenderized foods. This is mostly for comfort.  The priority is still fluids and protein.    Still make sure you are getting at least 48oz of fluids daily.  Take your 1 oz medicine cups home with you and continue to use them.  Protein intake goal is 60-80 grams per day.  Use only protein supplements that have “whey protein isolate” or “soy protein isolate” listed as the first ingredient.    Eat 4-6 mini-meals a day.  Drink 2 protein shakes daily and they count as food, one of the 4-6 meals daily.    Each meal should contain 1-2 oz. of pureed food.   To puree a food, put it in a  and blend it until there are no chunky pieces remaining. You may need to add a little broth or other moisture to achieve a smooth texture.    For reference:  Yes, it is normal:  A certain amount of difficulty swallowing is normal.    Nausea is normal    Be sure to take the anti-nausea medications that is ordered for you.  Call the office if it doesn't help    Some patients say that it feels like fluids or foods are in the back of their throat or  medications as soon as you can to avoid risk of opioid addiction.        Home medications:  zetirizine 10 mg PRN - this can be crushed  Cyclobenzaprine 10 mg - this can be crushed  Etodolac 500 mg  - this can be crushed  Hydroxyzine 10 mg - this can be crushed  Linaclotide 145 mg - capsules may be opened or administered with applesauce or in water.   For administration in applesauce, open capsule and sprinkle entire contents (beads) onto 1 teaspoonful of room temperature applesauce. Swallow contents immediately, do not chew.   For administration in water, open capsule and sprinkle entire contents (beads) into a cup with 30 ml of room temperature water. Swirl for at least 20 seconds and swallow immediately; add another 30 ml of water to any remaining beads in cup, swirl for at least 20 seconds and swallow immediately.   Omeprazole 40 mg CPDR - capsules can be opened and contents mixed with 1 T applesauce (something soft enough to swallow granules without chewing).  Potassium chloride (Klor-Con) 10 mEq - tablets cannot be crushed.  Tablets can be dissolved in ~4 ounces of warm water (pick out wax matrix before drinking).  Alternatively, oral solution, packets for solution, and effervescent tablets (Effer-K) are also available (same dosage and frequency).   Pregabalin 100 mg caps - can open capsule and dissolve contents in water.   Senna-S - this can be crushed  Spironolactone 25 mg - this can be crushed.   Prescription has been given for pain medication. Take as needed for pain control. For additional information regarding safe and effective pain control following surgery, please visit facs.org/safepaincontrol  Medications:  1.  Zofran - these pills are taken for nausea.  - take one pill every 6 hours as needed.      2. Levsin - this is taken for gas pain and cramping.  - dissolve 0.125 mg tablet under your tongue every 4-6 hours as needed.    3. Omeprazole- this medication reduces stomach acid.   -since you had a

## 2024-02-27 NOTE — H&P
Update History & Physical    The patient's History and Physical of January 30, 2024 was reviewed with the patient and I examined the patient. There was no change. The surgical site was confirmed by the patient and me.       Plan: The risks, benefits, expected outcome, and alternative to the recommended procedure have been discussed with the patient. Patient understands and wants to proceed with the procedure.     Electronically signed by Justice Lopez DO on 2/27/2024 at 8:26 AM

## 2024-02-27 NOTE — ANESTHESIA PRE PROCEDURE
Department of Anesthesiology  Preprocedure Note       Name:  Rhona Lockhart   Age:  39 y.o.  :  1985                                          MRN:  [id number]         Date:  2024      Surgeon: Surgeon(s):  Justice Lopez DO    Procedure: Procedure(s):  ROBOTIC ASSISTED LAPAROSCOPIC SLEEVE GASTRECTOMY/  HIATAL HERNIA REPAIR    Medications prior to admission:   Prior to Admission medications    Medication Sig Start Date End Date Taking? Authorizing Provider   acetaminophen (TYLENOL) 500 MG tablet Take 2 tablets by mouth every 6 hours as needed for Pain   Yes Alea Malone MD   FIBER-VITAMINS-MINERALS PO Take 1 tablet by mouth daily   Yes Alea Malone MD   omeprazole (PRILOSEC) 40 MG delayed release capsule Take 1 capsule by mouth daily 24   Alea Malone MD   sucralfate (CARAFATE) 1 GM tablet Take 1 tablet by mouth 3 times daily 23   Alea Malone MD   cetirizine (ZYRTEC) 10 MG tablet Take 1 tablet by mouth daily 4/10/23   Alea Malone MD   cyclobenzaprine (FLEXERIL) 10 MG tablet TAKE 1 TABLET BY MOUTH TWO TIMES A DAY AS NEEDED 23   Alea Malone MD   etodolac (LODINE) 400 MG tablet Take 500 mg by mouth 2 times daily 23   Alea Malone MD   hydrOXYzine HCl (ATARAX) 10 MG tablet TAKE ONE TABLET THREE TIMES A DAY AS NEEDED FOR ITCHING 23   Alea Malone MD   linaclotide (LINZESS) 145 MCG capsule Take 1 capsule by mouth daily as needed 23   Alea Malone MD   potassium chloride (KLOR-CON) 10 MEQ extended release tablet Take 1 tablet by mouth every other day    Alea Malone MD   pregabalin (LYRICA) 100 MG capsule TAKE ONE CAPSULE THREE TIMES A DAY 23   Alea Malone MD   GNP SENNA PLUS 8.6-50 MG per tablet Take 1 tablet by mouth 2 times daily 23   Alea Malone MD   simethicone (MYLICON) 80 MG chewable tablet Take 1 tablet by mouth    Alea Malone MD   spironolactone

## 2024-02-27 NOTE — PROGRESS NOTES
Pt to SDS for robotic assisted lap sleeve gastrectomy, etc.  Pt is alert; oriented x 4; speech clear; breathing easily on RA; walks with steady gait without assist.  Urine hcg 'negative.'  After warming pt, placed #18 IV in right hand and #20 IV in left wrist, and T&S X 2 drawn and sent to lab.  Glucose is 87.  Medicated pt with preop meds as ordered:  tylenol, lyrica, emend, scop patch, and lovenox.  IS teaching done, 850 ml goal and pt achieved 1500.  Attempted to contact pt's sister Ana María, but no answer with phone or in waiting room.  Pt states her sister may have left for work.  Pt voided X 2 in SDS.  Ancef 2 g IVPB to OR with pt.  Pt has call light within reach.

## 2024-02-28 VITALS
SYSTOLIC BLOOD PRESSURE: 107 MMHG | WEIGHT: 215.6 LBS | OXYGEN SATURATION: 97 % | TEMPERATURE: 98 F | BODY MASS INDEX: 35.92 KG/M2 | DIASTOLIC BLOOD PRESSURE: 71 MMHG | HEART RATE: 91 BPM | HEIGHT: 65 IN | RESPIRATION RATE: 18 BRPM

## 2024-02-28 LAB
ANION GAP SERPL CALCULATED.3IONS-SCNC: 11 MMOL/L (ref 3–16)
BUN SERPL-MCNC: 6 MG/DL (ref 7–20)
CALCIUM SERPL-MCNC: 8.1 MG/DL (ref 8.3–10.6)
CHLORIDE SERPL-SCNC: 100 MMOL/L (ref 99–110)
CO2 SERPL-SCNC: 22 MMOL/L (ref 21–32)
CREAT SERPL-MCNC: 0.7 MG/DL (ref 0.6–1.1)
DEPRECATED RDW RBC AUTO: 14.6 % (ref 12.4–15.4)
GFR SERPLBLD CREATININE-BSD FMLA CKD-EPI: >60 ML/MIN/{1.73_M2}
GLUCOSE SERPL-MCNC: 99 MG/DL (ref 70–99)
HCT VFR BLD AUTO: 35.4 % (ref 36–48)
HGB BLD-MCNC: 12.3 G/DL (ref 12–16)
MCH RBC QN AUTO: 30.8 PG (ref 26–34)
MCHC RBC AUTO-ENTMCNC: 34.7 G/DL (ref 31–36)
MCV RBC AUTO: 88.6 FL (ref 80–100)
PLATELET # BLD AUTO: 276 K/UL (ref 135–450)
PMV BLD AUTO: 8.6 FL (ref 5–10.5)
POTASSIUM SERPL-SCNC: 4.3 MMOL/L (ref 3.5–5.1)
RBC # BLD AUTO: 3.99 M/UL (ref 4–5.2)
SODIUM SERPL-SCNC: 133 MMOL/L (ref 136–145)
WBC # BLD AUTO: 13.2 K/UL (ref 4–11)

## 2024-02-28 PROCEDURE — 36415 COLL VENOUS BLD VENIPUNCTURE: CPT

## 2024-02-28 PROCEDURE — 2709999900 HC NON-CHARGEABLE SUPPLY

## 2024-02-28 PROCEDURE — C9113 INJ PANTOPRAZOLE SODIUM, VIA: HCPCS | Performed by: SURGERY

## 2024-02-28 PROCEDURE — 6360000002 HC RX W HCPCS: Performed by: SURGERY

## 2024-02-28 PROCEDURE — 2580000003 HC RX 258: Performed by: SURGERY

## 2024-02-28 PROCEDURE — 6360000002 HC RX W HCPCS: Performed by: NURSE PRACTITIONER

## 2024-02-28 PROCEDURE — 80048 BASIC METABOLIC PNL TOTAL CA: CPT

## 2024-02-28 PROCEDURE — 85027 COMPLETE CBC AUTOMATED: CPT

## 2024-02-28 PROCEDURE — 6370000000 HC RX 637 (ALT 250 FOR IP): Performed by: NURSE PRACTITIONER

## 2024-02-28 RX ORDER — ACETAMINOPHEN 160 MG/5ML
650 LIQUID ORAL EVERY 4 HOURS PRN
Status: DISCONTINUED | OUTPATIENT
Start: 2024-02-28 | End: 2024-02-28 | Stop reason: HOSPADM

## 2024-02-28 RX ORDER — LIDOCAINE 50 MG/G
1 PATCH TOPICAL DAILY
Qty: 10 PATCH | Refills: 0 | Status: SHIPPED | OUTPATIENT
Start: 2024-02-28 | End: 2024-03-09

## 2024-02-28 RX ORDER — HYOSCYAMINE SULFATE 0.12 MG/1
125 TABLET SUBLINGUAL EVERY 4 HOURS PRN
Qty: 30 EACH | Refills: 0 | Status: SHIPPED | OUTPATIENT
Start: 2024-02-28

## 2024-02-28 RX ORDER — PREGABALIN 50 MG/1
100 CAPSULE ORAL 3 TIMES DAILY
Status: DISCONTINUED | OUTPATIENT
Start: 2024-02-28 | End: 2024-02-28 | Stop reason: HOSPADM

## 2024-02-28 RX ORDER — LIDOCAINE 4 G/G
1 PATCH TOPICAL DAILY
Status: DISCONTINUED | OUTPATIENT
Start: 2024-02-28 | End: 2024-02-28 | Stop reason: HOSPADM

## 2024-02-28 RX ORDER — SIMETHICONE 80 MG
80 TABLET,CHEWABLE ORAL EVERY 6 HOURS PRN
Status: DISCONTINUED | OUTPATIENT
Start: 2024-02-28 | End: 2024-02-28 | Stop reason: HOSPADM

## 2024-02-28 RX ORDER — KETOROLAC TROMETHAMINE 15 MG/ML
15 INJECTION, SOLUTION INTRAMUSCULAR; INTRAVENOUS ONCE
Status: COMPLETED | OUTPATIENT
Start: 2024-02-28 | End: 2024-02-28

## 2024-02-28 RX ORDER — ONDANSETRON 4 MG/1
4 TABLET, ORALLY DISINTEGRATING ORAL 3 TIMES DAILY PRN
Qty: 21 TABLET | Refills: 1 | Status: SHIPPED | OUTPATIENT
Start: 2024-02-28

## 2024-02-28 RX ORDER — OXYCODONE HYDROCHLORIDE 5 MG/1
5 TABLET ORAL EVERY 6 HOURS PRN
Qty: 28 TABLET | Refills: 0 | Status: SHIPPED | OUTPATIENT
Start: 2024-02-28 | End: 2024-03-06

## 2024-02-28 RX ORDER — OXYCODONE HCL 5 MG/5 ML
10 SOLUTION, ORAL ORAL EVERY 4 HOURS PRN
Status: DISCONTINUED | OUTPATIENT
Start: 2024-02-28 | End: 2024-02-28 | Stop reason: HOSPADM

## 2024-02-28 RX ORDER — OXYCODONE HCL 5 MG/5 ML
5 SOLUTION, ORAL ORAL EVERY 4 HOURS PRN
Status: DISCONTINUED | OUTPATIENT
Start: 2024-02-28 | End: 2024-02-28 | Stop reason: HOSPADM

## 2024-02-28 RX ORDER — HYDROXYZINE HYDROCHLORIDE 10 MG/1
10 TABLET, FILM COATED ORAL 3 TIMES DAILY PRN
Status: DISCONTINUED | OUTPATIENT
Start: 2024-02-28 | End: 2024-02-28 | Stop reason: HOSPADM

## 2024-02-28 RX ADMIN — PREGABALIN 100 MG: 50 CAPSULE ORAL at 14:35

## 2024-02-28 RX ADMIN — ENOXAPARIN SODIUM 30 MG: 100 INJECTION SUBCUTANEOUS at 08:38

## 2024-02-28 RX ADMIN — HYOSCYAMINE SULFATE 250 MCG: 0.5 INJECTION, SOLUTION SUBCUTANEOUS at 03:29

## 2024-02-28 RX ADMIN — KETOROLAC TROMETHAMINE 15 MG: 15 INJECTION, SOLUTION INTRAMUSCULAR; INTRAVENOUS at 14:08

## 2024-02-28 RX ADMIN — HYOSCYAMINE SULFATE 250 MCG: 0.5 INJECTION, SOLUTION SUBCUTANEOUS at 13:25

## 2024-02-28 RX ADMIN — Medication: at 04:15

## 2024-02-28 RX ADMIN — SODIUM CHLORIDE, PRESERVATIVE FREE 10 ML: 5 INJECTION INTRAVENOUS at 08:39

## 2024-02-28 RX ADMIN — SODIUM CHLORIDE: 9 INJECTION, SOLUTION INTRAVENOUS at 11:39

## 2024-02-28 RX ADMIN — SODIUM CHLORIDE, PRESERVATIVE FREE 40 MG: 5 INJECTION INTRAVENOUS at 08:38

## 2024-02-28 RX ADMIN — OXYCODONE HYDROCHLORIDE 10 MG: 5 SOLUTION ORAL at 13:23

## 2024-02-28 RX ADMIN — DIPHENHYDRAMINE HYDROCHLORIDE 25 MG: 50 INJECTION INTRAMUSCULAR; INTRAVENOUS at 09:25

## 2024-02-28 RX ADMIN — METOCLOPRAMIDE 10 MG: 5 INJECTION, SOLUTION INTRAMUSCULAR; INTRAVENOUS at 01:00

## 2024-02-28 RX ADMIN — SODIUM CHLORIDE: 9 INJECTION, SOLUTION INTRAVENOUS at 03:43

## 2024-02-28 RX ADMIN — OXYCODONE HYDROCHLORIDE 10 MG: 5 SOLUTION ORAL at 08:35

## 2024-02-28 ASSESSMENT — PAIN DESCRIPTION - LOCATION
LOCATION: ABDOMEN

## 2024-02-28 ASSESSMENT — PAIN SCALES - GENERAL
PAINLEVEL_OUTOF10: 8
PAINLEVEL_OUTOF10: 9
PAINLEVEL_OUTOF10: 10
PAINLEVEL_OUTOF10: 10
PAINLEVEL_OUTOF10: 6
PAINLEVEL_OUTOF10: 2
PAINLEVEL_OUTOF10: 8
PAINLEVEL_OUTOF10: 2
PAINLEVEL_OUTOF10: 2
PAINLEVEL_OUTOF10: 9

## 2024-02-28 ASSESSMENT — PAIN DESCRIPTION - ORIENTATION
ORIENTATION: MID;UPPER
ORIENTATION: MID;UPPER
ORIENTATION: RIGHT;LEFT

## 2024-02-28 ASSESSMENT — PAIN DESCRIPTION - PAIN TYPE
TYPE: SURGICAL PAIN

## 2024-02-28 ASSESSMENT — PAIN DESCRIPTION - FREQUENCY
FREQUENCY: CONTINUOUS

## 2024-02-28 ASSESSMENT — PAIN DESCRIPTION - DESCRIPTORS
DESCRIPTORS: SHARP;PRESSURE
DESCRIPTORS: ACHING;SHARP
DESCRIPTORS: SHARP;PRESSURE
DESCRIPTORS: SHARP;ACHING
DESCRIPTORS: SHARP;PRESSURE

## 2024-02-28 ASSESSMENT — PAIN - FUNCTIONAL ASSESSMENT
PAIN_FUNCTIONAL_ASSESSMENT: PREVENTS OR INTERFERES SOME ACTIVE ACTIVITIES AND ADLS
PAIN_FUNCTIONAL_ASSESSMENT: ACTIVITIES ARE NOT PREVENTED
PAIN_FUNCTIONAL_ASSESSMENT: ACTIVITIES ARE NOT PREVENTED

## 2024-02-28 ASSESSMENT — PAIN DESCRIPTION - ONSET
ONSET: ON-GOING

## 2024-02-28 NOTE — CARE COORDINATION
Case Management Assessment  Initial Evaluation    Date/Time of Evaluation: 2/28/2024 3:44 PM  Assessment Completed by: Antonia Garcia RN    If patient is discharged prior to next notation, then this note serves as note for discharge by case management.    Patient Name: Rhona Lockhart                   YOB: 1985  Diagnosis: Morbid obesity (HCC) [E66.01]  Hiatal hernia [K44.9]  Metabolic syndrome [E88.810]                   Date / Time: 2/27/2024  7:13 AM    Patient Admission Status: Inpatient   Readmission Risk (Low < 19, Mod (19-27), High > 27): Readmission Risk Score: 5.6    Current PCP: Krissy Bazan MD  PCP verified by CM? No    Chart Reviewed: Yes      History Provided by: Patient  Patient Orientation: Alert and Oriented    Patient Cognition: Alert    Hospitalization in the last 30 days (Readmission):  No    If yes, Readmission Assessment in CM Navigator will be completed.    Advance Directives:      Code Status: Full Code   Patient's Primary Decision Maker is: Legal Next of Kin      Discharge Planning:    Patient lives with: Family Members Type of Home: House  Primary Care Giver: Self  Patient Support Systems include: Family Members   Current Financial resources: None  Current community resources: None  Current services prior to admission: None            Current DME:              Type of Home Care services:  None    ADLS  Prior functional level: Independent in ADLs/IADLs  Current functional level: Independent in ADLs/IADLs    PT AM-PAC:   /24  OT AM-PAC:   /24    Family can provide assistance at DC: No  Would you like Case Management to discuss the discharge plan with any other family members/significant others, and if so, who? No  Plans to Return to Present Housing: Yes  Other Identified Issues/Barriers to RETURNING to current housing: n/a  Potential Assistance needed at discharge: N/A            Potential DME:    Patient expects to discharge to: House  Plan for transportation at  discharge: Self    Financial    Payor: [employer] / Plan: [employer] [place] MEDICAID / Product Type: *No Product type* /     Does insurance require precert for SNF: Yes    Potential assistance Purchasing Medications: No  Meds-to-Beds request:        [facility] [place] - [address]  - P [phone] - F [phone]  [address] [facility] [address]  Phone: [phone] Fax: [phone]      Notes:    Factors facilitating achievement of predicted outcomes: Family support, Motivated, and Cooperative    Barriers to discharge: Pain and itching, acosta diet    Additional Case Management Notes: Patient is from home with family and will return to home at d/c. She will have transport to home and denies needs from .     The Plan for Transition of Care is related to the following treatment goals of Morbid obesity (HCC) [E66.01]  Hiatal hernia [K44.9]  Metabolic syndrome [E88.810]    IF APPLICABLE: The Patient and/or patient representative Rhona and her family were provided with a choice of provider and agrees with the discharge plan. Freedom of choice list with basic dialogue that supports the patient's individualized plan of care/goals and shares the quality data associated with the providers was provided to: Patient   Patient Representative Name:       The Patient and/or Patient Representative Agree with the Discharge Plan? Yes    Antonia Garcia RN  Case Management Department  Ph: [phone] Fax: [phone]

## 2024-02-28 NOTE — CONSULTS
Clinical Pharmacy Progress Note    Admit date: 2/27/2024     Asked to review home medications to see what can be crushed or changed to liquid formulation.    Fill history was assessed. It appears patient takes the following routine medications.   Cetirizine 10 mg PRN - this can be crushed  Cyclobenzaprine 10 mg - this can be crushed  Etodolac 500 mg  - this can be crushed  Hydroxyzine 10 mg - this can be crushed  Linaclotide 145 mg - capsules may be opened or administered with applesauce or in water.   For administration in applesauce, open capsule and sprinkle entire contents (beads) onto 1 teaspoonful of room temperature applesauce. Swallow contents immediately, do not chew.   For administration in water, open capsule and sprinkle entire contents (beads) into a cup with 30 ml of room temperature water. Swirl for at least 20 seconds and swallow immediately; add another 30 ml of water to any remaining beads in cup, swirl for at least 20 seconds and swallow immediately.   Omeprazole 40 mg CPDR - capsules can be opened and contents mixed with 1 T applesauce (something soft enough to swallow granules without chewing).  Potassium chloride (Klor-Con) 10 mEq - tablets cannot be crushed.  Tablets can be dissolved in ~4 ounces of warm water (pick out wax matrix before drinking).  Alternatively, oral solution, packets for solution, and effervescent tablets (Effer-K) are also available (same dosage and frequency).   Pregabalin 100 mg caps - can open capsule and dissolve contents in water.   Senna-S - this can be crushed  Spironolactone 25 mg - this can be crushed.       Please let me know if you have questions!   Lincoln Smith, PharmD, [facility]  [id number]  02/28/24 10:33 AM

## 2024-02-28 NOTE — PROGRESS NOTES
Surgery Daily Progress Note  Rhona Lockhart  CC: Morbid Obesity  Subjective :  No emesis overnight.  No nausea.  C/o bad taste in her mouth.  [place] ice cubes.    Pain tolerable.  Mostly c/o gas pains.  Has been up OOB and ambulating.  Currently sitting up in chair.      Objective    Infusions:   HYDROmorphone      sodium chloride 150 mL/hr at 02/28/24 0343    sodium chloride          I/O:I/O last 3 completed shifts:  In: 2430 [I.V.:2380; IV Piggyback:50]  Out: 220 [Urine:200; Blood:20]           Wt Readings from Last 1 Encounters:   02/27/24 97.8 kg (215 lb 9.6 oz)                  Exam:/74   Pulse 91   Temp 97.9 °F (36.6 °C) (Oral)   Resp 23   Ht 1.651 m (5' 5\")   Wt 97.8 kg (215 lb 9.6 oz)   LMP 01/30/2024   SpO2 99%   Breastfeeding No   BMI 35.88 kg/m²     General appearance: alert, appears stated age and cooperative  Lungs: clear to auscultation bilaterally  Heart: regular rate and rhythm, S1, S2 normal, no murmur, click, rub or gallop  Abdomen: soft, appropriately-tender; bowel sounds quiet  Trocar sites well approx      ASSESSMENT/PLAN: Pt. is a 39 y.o. female s/p Robotic Assisted Laparoscopic Sleeve Gastrectomy, Hiatal hernia Repair, Laparoscopic ventral hernia repair POD# 1    Overall, doing well  Advance according to clinical pathway  Start on Bariatric Clears  Pharmacy to review home meds -pills to be crushed for 2 weeks post op    Monitor progress throughout the day. D/C late afternoon as long as able to take adequate po to remain hydrated without IVF, voiding, pain and nausea tolerable with oral meds, using IS frequently and ambulating.    D/W Dr. Jessica Del Cid, APRN - CNP 2/28/2024 6:12 AM  [phone]

## 2024-02-28 NOTE — PROGRESS NOTES
Pt alert and oriented x4. Abd pain was controlled with dilaudid PCA pump. Pt satisfied. Pt walked in cross full loop tolerated well. Lap sites dci. Abd binder in place. Pt tolerated SCD. /74   Pulse 91   Temp 97.9 °F (36.6 °C) (Oral)   Resp 23   Ht 1.651 m (5' 5\")   Wt 97.8 kg (215 lb 9.6 oz)   LMP 01/30/2024   SpO2 99%   Breastfeeding No   BMI 35.88 kg/m²

## 2024-02-29 ENCOUNTER — TELEPHONE (OUTPATIENT)
Dept: BARIATRICS/WEIGHT MGMT | Age: 39
End: 2024-02-29

## 2024-02-29 NOTE — TELEPHONE ENCOUNTER
Patient is 2 days s/p sleeve/H hernia repair/V hernia repair done on 2/27/24    Patient calling the office with reports of burning pain on incisions.   Did not report fever, nausea, vomiting, chills, or diarrhea, glue is still intact on incisions. Patient is crushing medications, and is following liquid diet at this time.    Did let patient know she can use her Lidoderm patches but to not put them directly onto incisions, only beside them. Can use tylenol every 6 hours as needed, as well the Oxycodone that was prescribed, instructed to continue use of abdominal binder, and may use ice on the area.     Patient had no further concerns and is doing well other wise. Please advise if you would like anything to be done differently. Thank you!

## 2024-03-03 PROBLEM — K43.9 VENTRAL HERNIA WITHOUT OBSTRUCTION OR GANGRENE: Status: ACTIVE | Noted: 2024-03-03

## 2024-03-03 NOTE — OP NOTE
[facility]   [employer] [facility] [employer]      Procedure Note    Indications: The patient was evaluated by our multidisciplinary team and was found to be a good candidate for weight loss surgery. Morbid Obesity BMI 40.36    Pre-operative Diagnosis:   Metabolic Syndrome  Morbid Obesity 40.36  Hiatal Hernia with GERD  Ventral Hernia     Post-operative Diagnosis:   Metabolic Syndrome  Morbid Obesity 40.36  Hiatal Hernia with GERD  Ventral Hernia Reducible Measuring 1.8 cm    Procedure:    - Robotic Sleeve Gastrectomy.  - Robotic Hiatal Hernia Repair   - Laparoscopic Ventral hernia Repair, reducible, measuring 1.8cm      Surgeon: Justice Lopez DO    Anesthesia: General endotracheal anesthesia      Procedure Details   The patient was seen again in the Holding Room. The risks, benefits, complications, treatment options, and expected outcomes were discussed with the patient and/or family. The possibilities of reaction to medication, pulmonary aspiration, perforation of viscus, bleeding, recurrent infection, strictures, leaks, failure to lose weight, regaining weight,  the need for additional procedures, failure to diagnose a condition, and creating a complication requiring transfusion or operation were discussed. There was concurrence with the proposed plan and informed consent was obtained.  The site of surgery was properly noted/marked. The patient was taken to Operating Room, identified as Rhona Lockhart and the procedure verified as Laparoscopic Sleeve Gastrectomy. A Time Out was held and the above information confirmed.    The patient was placed in the supine position and general anesthesia was induced, along with placement of orogastric tube and SCD's. Lovenox SQ given pre-operatively. IV Antibiotics given pre-operatively. All pressure points were padded properly.     A left upper quadrant incision was made and a veres needle was inserted after confirming.  After adequate pneumoperitoneum was obtained, a 5

## 2024-03-04 ENCOUNTER — TELEPHONE (OUTPATIENT)
Dept: BARIATRICS/WEIGHT MGMT | Age: 39
End: 2024-03-04

## 2024-03-04 NOTE — TELEPHONE ENCOUNTER
Sleeve 2/27/24, pt called said has a lot of swelling in legs and feet has tried to keep feet up, is not helping.

## 2024-03-04 NOTE — TELEPHONE ENCOUNTER
with patient how to pace her fluids with her shakes, drinking 1/2 shake at a time.  Feels she will be able to drink the shakes focusing on 1/2.  Discussed using Levsin for spams and to ensure she is take small slow sips.    Patient had called into clinic about feel swelling  patient has not been taking her spironolactone since surgery.  Instructed pt to start taking this medication and to follow up with her PCP for feet/leg swelling as needed.  Instructed pt when not walking to keep feet/legs elevated to help with this swelling. Patient verbalized understanding.  Plan for this nurse to follow up with patient on Wednesday to assess fluid/protein goals and feet/leg swelling.Pt in agreement.     Patient was asked to please fill out hospital survey if she receives one in the mail.

## 2024-03-05 NOTE — DISCHARGE SUMMARY
Patient ID:  Rhona Lockhart  [id number]  39 y.o.  1985    Admit date: 2/27/2024    Discharge date and time: 2/28/24    Admitting Physician: Justice Lopez DO     Discharge Physician: same    Admission Diagnoses: Morbid obesity (HCC) [E66.01]  Hiatal hernia [K44.9]  Metabolic syndrome [E88.810]  Metabolic syndrome    Discharge Diagnoses: same    Admission Condition: fair    Discharged Condition: stable    Indication for Admission: Surgery: Robotic assisted Laparoscopic Sleeve Gastrectomy, IV hydration, monitoring, pain and nausea management    Hospital Course: 39 y.o. female admitted with morbid obesity (BMI 35.88) and metabolic syndrome who underwent Robotic assisted laparoscopic sleeve gastrectomy, Hiatal hernia Repair, Laparoscopic ventral hernia repair.  Surgery was uneventful and she was admitted to bariatric post-operative surgical floor in stable condition for IV hydration, hemodynamic monitoring, along with blood sugar monitoring and pain and nausea management.  She states her main goal for having surgery is to lose weight so she can have a breast reduction.  Even at this time her breast were giving her significant discomfort against her laparoscopic incisions.  She was given a surgical bra to help with this.  The following morning the pain was tolerable on po pain medication and was taking adequate po.  Her discharge instructions were reviewed in great length.  She was discharged in stable condition.      Treatments: IV hydration        Disposition: home    Patient Instructions:     Activity: activity as tolerated and no driving while on analgesics  Diet: clear liquids  Wound Care: as directed    Follow-up with Dr. Lopez in two weeks.        Signed:  CATALINA Mcmahon CNP  3/5/2024  12:42 PM

## 2024-03-06 ENCOUNTER — TELEPHONE (OUTPATIENT)
Dept: BARIATRICS/WEIGHT MGMT | Age: 39
End: 2024-03-06

## 2024-03-06 NOTE — TELEPHONE ENCOUNTER
Call placed to pt to assess fluid and protein intake.  Patient states she has consistently reached 48 oz fluid since Monday  and has had 1.5 shakes a day and has added protein water to increase her fluid and protein intake.  Patient does state her feet are still swollen. She has restarted her aldactone and did call her PCP to get her potassium switched to liquid, but did not notify them of the swelling in feet.  Instructed pt to call her PCP today to notify of the swelling in her feet. Patient states she would call today.

## 2024-03-13 ENCOUNTER — OFFICE VISIT (OUTPATIENT)
Dept: BARIATRICS/WEIGHT MGMT | Age: 39
End: 2024-03-13

## 2024-03-13 VITALS
WEIGHT: 211 LBS | HEART RATE: 89 BPM | BODY MASS INDEX: 35.16 KG/M2 | DIASTOLIC BLOOD PRESSURE: 74 MMHG | SYSTOLIC BLOOD PRESSURE: 104 MMHG | HEIGHT: 65 IN

## 2024-03-13 DIAGNOSIS — E88.810 METABOLIC SYNDROME: Primary | ICD-10-CM

## 2024-03-13 DIAGNOSIS — E66.9 OBESITY (BMI 30-39.9): ICD-10-CM

## 2024-03-13 DIAGNOSIS — K21.9 CHRONIC GERD: ICD-10-CM

## 2024-03-13 PROCEDURE — 99024 POSTOP FOLLOW-UP VISIT: CPT | Performed by: SURGERY

## 2024-03-13 NOTE — PROGRESS NOTES
Dietary Assessment Note  Vitals:   Vitals:    24 0920   BP: 104/74   Pulse: 89   Weight: 95.7 kg (211 lb)   Height: 1.651 m (5' 5\")   Patient lost 16 lbs since pre-op.    Total Weight Loss: 26 lbs    Labs reviewed: no new labs    Protein intake: 60-80 grams/day - 2 protein shakes daily (1 with 8oz 1% milk / 1 with water)    Fluid intake: 48-64 oz/day- water / gatorade zero w/ protein (1 daily) / sf koolaid    Multivitamin/mineral intake: yes - 1 fusion capsule with iron daily    Calcium intake:  to start at 6 weeks post-op    Other: none    Exercise:  up moving around, no strenuous exercise    Nutrition Assessment: 2 week post-op visit.     Pt is eating pureed food 3-4x/day.  Pt has tried: CC, mashed potatoes, tuna & applesauce.    Amount able to eat per sittin/4 cup     Following 30/30/30 rule: yes    Food Intolerances/issues: none    Client Concerns: sometimes feels a little bubbly in her chest/throat & hiccups, using levsin / omeprazole    Goals:   - Continue plan  - Ensure sipping fluids 3-4 sips of 1oz over 7-15 min time span    Plan: f/u at 6 weeks post-op    Keshia Perdomo RD, LD   
Reviewed   Constitutional: Patient is oriented to person, place, and time. Patient appears well-developed and well-nourished. Patient is active and cooperative.  Non-toxic appearance. No distress.   Neck: Trachea normal and normal range of motion. No JVD present.   Pulmonary/Chest: Effort normal. No accessory muscle usage or stridor. No apnea. No respiratory distress.   Cardiovascular: Normal rate and no JVD.   Abdominal: Normal appearance. Patient exhibits no distension. Abdomen is soft, obese, non tender.           A/P     Rhona Lockhart is 39 y.o. female , now with Body mass index is 35.11 kg/m².  s/p Sleeve gastrectomy, has lost 16 lbs since last visit, total of 26 lbs weight loss. The patient underwent dietary counseling with registered dietician. I have reviewed, discussed and agree with the dietary plan. Patient is trying hard to keep good dietary and behavior modifications. Patient is monitoring portion sizes, food choices and liquid calories.  Patient is trying to exercise regularly. Patient pleased with the surgery outcomes.  We discussed how her weight affects her overall health including:  Rhona was seen today for bariatrics post op follow up.    Diagnoses and all orders for this visit:    Metabolic syndrome    Obesity (BMI 30-39.9)    Chronic GERD       and importance of weight loss to alleviate those co morbid conditions.   I encouraged the patient to continue exercise and keeping healthy eating habits. Also counseled the patient extensively on post surgery care.     Severe Obesity: Body mass index is 35.11 kg/m².  [x] Continue to make dietary and lifestyle modifications.  [x] Return for follow-up next month.      Chronic GERD:   [x] Continue to make dietary and lifestyle modifications.  [] Continue Omeprazole.  [] Continue Famotidine.  [x] Weight loss Recommended.    Metabolic Syndrome:   [x] Continue to make dietary and lifestyle modifications.  [x] Continue to follow up with their PCP for Medical

## 2024-03-18 ENCOUNTER — TELEPHONE (OUTPATIENT)
Dept: BARIATRICS/WEIGHT MGMT | Age: 39
End: 2024-03-18

## 2024-03-18 NOTE — TELEPHONE ENCOUNTER
Patient is s/p sleeve 2/27/24 and has misplaced the phase of the diet where she is to have soft and mushy and would like a copy of diet emailed to her.    [email or web address]

## 2024-04-03 ENCOUNTER — OFFICE VISIT (OUTPATIENT)
Dept: BARIATRICS/WEIGHT MGMT | Age: 39
End: 2024-04-03

## 2024-04-03 VITALS
DIASTOLIC BLOOD PRESSURE: 81 MMHG | WEIGHT: 203 LBS | SYSTOLIC BLOOD PRESSURE: 115 MMHG | BODY MASS INDEX: 34.66 KG/M2 | HEIGHT: 64 IN | HEART RATE: 76 BPM

## 2024-04-03 DIAGNOSIS — E66.9 OBESITY (BMI 30-39.9): ICD-10-CM

## 2024-04-03 DIAGNOSIS — E88.810 METABOLIC SYNDROME: Primary | ICD-10-CM

## 2024-04-03 DIAGNOSIS — K21.9 CHRONIC GERD: ICD-10-CM

## 2024-04-03 PROCEDURE — 99024 POSTOP FOLLOW-UP VISIT: CPT | Performed by: SURGERY

## 2024-04-03 RX ORDER — HYDROCODONE BITARTRATE AND ACETAMINOPHEN 5; 325 MG/1; MG/1
TABLET ORAL
COMMUNITY
Start: 2024-03-18

## 2024-04-03 NOTE — PROGRESS NOTES
Dietary Assessment Note      Vitals:   Vitals:    24 0920   BP: 115/81   Pulse: 76   Weight: 92.1 kg (203 lb)   Height: 1.632 m (5' 4.25\")       Protein intake: 60-80 grams/day     Fluid intake: 48-64 oz/day water, gatorade zero protein    Multivitamin/mineral intake: yes Fusion with Fe    Calcium intake:  not yet     Other: none    Exercise: up n walking around    Nutrition Assessment: 5 wk post-op visit.   B: eggs OR protein shake  S: wheat thins   L: turkey/tuna + carrots  S: CC  D: turkey/tuna + carrots    Amount able to eat per sittinoz + side     Following  rule: waits 30 min before and after to drink, 20-30 min     Food Intolerances/issues: none    Client Concerns: none     Handout: CHLOÉ Gamble, MS, RD, LD  
Outpatient Medications:     HYDROcodone-acetaminophen (NORCO) 5-325 MG per tablet, TAKE 1/2 TABLET BY MOUTH TWO TIMES A DAY AS DIRECTED, Disp: , Rfl:     ondansetron (ZOFRAN-ODT) 4 MG disintegrating tablet, Take 1 tablet by mouth 3 times daily as needed for Nausea or Vomiting, Disp: 21 tablet, Rfl: 1    Hyoscyamine Sulfate SL (LEVSIN/SL) 0.125 MG SUBL, Place 125 mcg under the tongue every 4 hours as needed (spasms), Disp: 30 each, Rfl: 0    acetaminophen (TYLENOL) 500 MG tablet, Take 2 tablets by mouth every 6 hours as needed for Pain, Disp: , Rfl:     FIBER-VITAMINS-MINERALS PO, Take 1 tablet by mouth daily, Disp: , Rfl:     omeprazole (PRILOSEC) 40 MG delayed release capsule, Take 1 capsule by mouth daily, Disp: , Rfl:     sucralfate (CARAFATE) 1 GM tablet, Take 1 tablet by mouth 3 times daily, Disp: , Rfl:     cetirizine (ZYRTEC) 10 MG tablet, Take 1 tablet by mouth daily, Disp: , Rfl:     cyclobenzaprine (FLEXERIL) 10 MG tablet, TAKE 1 TABLET BY MOUTH TWO TIMES A DAY AS NEEDED, Disp: , Rfl:     hydrOXYzine HCl (ATARAX) 10 MG tablet, TAKE ONE TABLET THREE TIMES A DAY AS NEEDED FOR ITCHING, Disp: , Rfl:     linaclotide (LINZESS) 145 MCG capsule, Take 1 capsule by mouth daily as needed, Disp: , Rfl:     potassium chloride (KLOR-CON) 10 MEQ extended release tablet, Take 8 mEq by mouth every other day, Disp: , Rfl:     pregabalin (LYRICA) 100 MG capsule, TAKE ONE CAPSULE THREE TIMES A DAY, Disp: , Rfl:     GNP SENNA PLUS 8.6-50 MG per tablet, Take 1 tablet by mouth 2 times daily, Disp: , Rfl:     simethicone (MYLICON) 80 MG chewable tablet, Take 1 tablet by mouth, Disp: , Rfl:     spironolactone (ALDACTONE) 25 MG tablet, Take 1 tablet by mouth daily, Disp: , Rfl:       Review of Systems - History obtained from the patient  General ROS: negative  Psychological ROS: negative  Hematological and Lymphatic ROS: negative  Endocrine ROS: negative  Respiratory ROS: negative  Cardiovascular ROS: negative  Gastrointestinal

## 2024-06-12 ENCOUNTER — OFFICE VISIT (OUTPATIENT)
Dept: BARIATRICS/WEIGHT MGMT | Age: 39
End: 2024-06-12
Payer: COMMERCIAL

## 2024-06-12 VITALS
WEIGHT: 186 LBS | BODY MASS INDEX: 31.76 KG/M2 | SYSTOLIC BLOOD PRESSURE: 114 MMHG | HEART RATE: 73 BPM | DIASTOLIC BLOOD PRESSURE: 79 MMHG | HEIGHT: 64 IN

## 2024-06-12 DIAGNOSIS — K21.9 CHRONIC GERD: ICD-10-CM

## 2024-06-12 DIAGNOSIS — E88.810 METABOLIC SYNDROME: Primary | ICD-10-CM

## 2024-06-12 DIAGNOSIS — E66.9 OBESITY (BMI 30-39.9): ICD-10-CM

## 2024-06-12 PROCEDURE — G8417 CALC BMI ABV UP PARAM F/U: HCPCS | Performed by: SURGERY

## 2024-06-12 PROCEDURE — 99214 OFFICE O/P EST MOD 30 MIN: CPT | Performed by: SURGERY

## 2024-06-12 PROCEDURE — G8427 DOCREV CUR MEDS BY ELIG CLIN: HCPCS | Performed by: SURGERY

## 2024-06-12 PROCEDURE — 1036F TOBACCO NON-USER: CPT | Performed by: SURGERY

## 2024-06-12 RX ORDER — POTASSIUM CHLORIDE 600 MG/1
8 TABLET, FILM COATED, EXTENDED RELEASE ORAL DAILY
COMMUNITY

## 2024-06-12 NOTE — PATIENT INSTRUCTIONS
Patient received dietary handouts and education.    Goals:   - Choose MVI from alternative list   - Including fruit or vegetable with all meals and snacks  - Continue walking and add resistance exercise 2X/week

## 2024-06-12 NOTE — PROGRESS NOTES
Dietary Assessment Note      Vitals:   Vitals:    06/12/24 1152   BP: 114/79   Pulse: 73   Weight: 84.4 kg (186 lb)   Height: 1.632 m (5' 4.25\")    Patient lost 17 lbs over 2 month.    Total Weight Loss: 51 lbs    Labs reviewed: no lab studies available for review at time of visit    Protein intake: 60-80 grams/day     Fluid intake: 48-64 oz/day water/ Gatorade zero    Multivitamin/mineral intake: Gummy MVI 2/day    Calcium intake: Fusion Calcium soft chews 2/day at separate times    Other: None    Exercise:  ADL, walking     Nutrition Assessment: 4 months post-op visit.   Breakfast: 1 Egg OR Protein Shake  Snack: None OR wheat thins  Lunch: Deli turkey w/ mustard    Snack: CC + sometimes fruit or cucumber  Dinner: Turkey + carrots/green beans   Snack: Wheat thins + lf cream cheese OR Protein yogurt     Amount able to eat per sitting: uses food scale 2-3 oz meat + 1/2 cup side    Following 30/30/30 rule: Eating over 30 minutes. Waits 30 minutes between eating and drinking.    Food Intolerances/issues: none    Client Concerns: MVI alternatives- handout provided     Goals:   - Choose MVI from alternative list   - Including fruit or vegetable with all meals and snacks  - Continue walking and add resistance exercise 2X/week    Plan: Follow up at 6 months post op and as needed    Gemini Tam RD, LD    
tablet, TAKE 1/2 TABLET BY MOUTH TWO TIMES A DAY AS DIRECTED, Disp: , Rfl:     ondansetron (ZOFRAN-ODT) 4 MG disintegrating tablet, Take 1 tablet by mouth 3 times daily as needed for Nausea or Vomiting, Disp: 21 tablet, Rfl: 1    Hyoscyamine Sulfate SL (LEVSIN/SL) 0.125 MG SUBL, Place 125 mcg under the tongue every 4 hours as needed (spasms), Disp: 30 each, Rfl: 0    acetaminophen (TYLENOL) 500 MG tablet, Take 2 tablets by mouth every 6 hours as needed for Pain, Disp: , Rfl:     FIBER-VITAMINS-MINERALS PO, Take 1 tablet by mouth daily, Disp: , Rfl:     omeprazole (PRILOSEC) 40 MG delayed release capsule, Take 1 capsule by mouth daily, Disp: , Rfl:     sucralfate (CARAFATE) 1 GM tablet, Take 1 tablet by mouth 3 times daily, Disp: , Rfl:     cetirizine (ZYRTEC) 10 MG tablet, Take 1 tablet by mouth daily, Disp: , Rfl:     cyclobenzaprine (FLEXERIL) 10 MG tablet, TAKE 1 TABLET BY MOUTH TWO TIMES A DAY AS NEEDED, Disp: , Rfl:     hydrOXYzine HCl (ATARAX) 10 MG tablet, TAKE ONE TABLET THREE TIMES A DAY AS NEEDED FOR ITCHING, Disp: , Rfl:     linaclotide (LINZESS) 145 MCG capsule, Take 1 capsule by mouth daily as needed, Disp: , Rfl:     potassium chloride (KLOR-CON) 10 MEQ extended release tablet, Take 8 mEq by mouth every other day, Disp: , Rfl:     pregabalin (LYRICA) 100 MG capsule, TAKE ONE CAPSULE THREE TIMES A DAY, Disp: , Rfl:     GNP SENNA PLUS 8.6-50 MG per tablet, Take 1 tablet by mouth 2 times daily, Disp: , Rfl:     simethicone (MYLICON) 80 MG chewable tablet, Take 1 tablet by mouth, Disp: , Rfl:     spironolactone (ALDACTONE) 25 MG tablet, Take 1 tablet by mouth daily, Disp: , Rfl:       Review of Systems - History obtained from the patient  General ROS: negative  Psychological ROS: negative  Hematological and Lymphatic ROS: negative  Endocrine ROS: negative  Respiratory ROS: negative  Cardiovascular ROS: negative  Gastrointestinal ROS:negative  Genito-Urinary ROS: negative  Musculoskeletal ROS: negative

## 2024-08-13 NOTE — PROGRESS NOTES
[facility]   General & Laparoscopic Surgery  [facility] [employer]       HPI:     Rhona Lockhart is a very pleasant 39 y.o. obese female , Body mass index is 29.12 kg/m².. And multiple medical problems who is presenting for bariatric follow up care.   Rhona Lockhart is s/p laparoscopic sleeve gastrectomy by me   Comes today to the clinic without any complaints. Patient denies any nausea, vomiting, fevers, chills, shortness of breath, chest pain, constipation or urinary symptoms. Denies any heartburn nor dysphagia.   Patient is feeling very well, and is very active. Patient is very pleased with the weight loss and resolution of co-morbid conditions.        Past Medical History:   Diagnosis Date    Anxiety     Arthritis     COVID-19     Depression     OCD (obsessive compulsive disorder)     Urinary incontinence      Past Surgical History:   Procedure Laterality Date    CHOLECYSTECTOMY      ENDOSCOPY, COLON, DIAGNOSTIC      SLEEVE GASTRECTOMY N/A 2/27/2024    ROBOTIC ASSISTED LAPAROSCOPIC SLEEVE GASTRECTOMY, HIATAL HERNIA REPAIR, LAPAROSCOPIC VENTRAL HERNIA REPAIR performed by Justice Lopez DO at [facility] OR    UPPER GASTROINTESTINAL ENDOSCOPY N/A 09/11/2023    EGD BIOPSY performed by Justice Lopez DO at [facility] ENDOSCOPY     Family History   Problem Relation Age of Onset    Diabetes Mother     Alcohol Abuse Paternal Grandfather     Mental Illness Brother     Depression Brother      Social History     Tobacco Use    Smoking status: Never     Passive exposure: Current (familly smokes in home)    Smokeless tobacco: Never    Tobacco comments:     Passive exposure, family smokes   Substance Use Topics    Alcohol use: Yes     Comment: very rarely, once a year     I counseled the patient on the importance of not smoking and risks of ETOH.   No Known Allergies  Vitals:    08/21/24 1152   BP: 131/80   Pulse: 84   Weight: 77.6 kg (171 lb)   Height: 1.632 m (5' 4.25\")       Body mass index is 29.12 kg/m².      Current  presence and it is both accurate and complete. 09/01/24

## 2024-08-19 ENCOUNTER — CLINICAL DOCUMENTATION (OUTPATIENT)
Dept: BARIATRICS/WEIGHT MGMT | Age: 39
End: 2024-08-19

## 2024-08-19 NOTE — PROGRESS NOTES
This eval was conducted via phone on 8/19/24 in preparation or their post-surgical visit on 8/21/24 with Dr. Lopez.     Dietary Assessment Note      Vitals: Self reported wt: 178 lbs. Lost 8 lbs over 2 mos.     Total Weight Loss: 57 lbs    Labs reviewed: no lab studies available for review at time of visit    Protein intake: 60-80 grams/day     Fluid intake: 48-64 oz/day sf koolaid, water     Multivitamin/mineral intake: yes womens one a day or Gummy MVI 2/day      Calcium intake: yes 2 chews    Other: Iron    Exercise:  walking, working in the yard     Nutrition Assessment: 6 mos post-op visit.   B: eggs   S: crackers + CC  L: turkey + veggies  S: none OR protein shake  D: turkey breast OR ground beef lean + g beans/carrots   S: CC + crackers     Amount able to eat per sitting: uses food scale 3-4 oz meat + 1/2 cup side     Following 30/30/30 rule: yes    Food Intolerances/issues: none    Client Concerns: none    Goals:   - Continue diet plan  - Increase exercise as able    Plan: f/u per provider    CHLOÉ BALLESTEROS, MS, RD, LD

## 2024-08-21 ENCOUNTER — OFFICE VISIT (OUTPATIENT)
Dept: BARIATRICS/WEIGHT MGMT | Age: 39
End: 2024-08-21
Payer: COMMERCIAL

## 2024-08-21 VITALS
WEIGHT: 171 LBS | DIASTOLIC BLOOD PRESSURE: 80 MMHG | SYSTOLIC BLOOD PRESSURE: 131 MMHG | HEIGHT: 64 IN | BODY MASS INDEX: 29.19 KG/M2 | HEART RATE: 84 BPM

## 2024-08-21 DIAGNOSIS — K21.9 CHRONIC GERD: ICD-10-CM

## 2024-08-21 DIAGNOSIS — E88.810 METABOLIC SYNDROME: ICD-10-CM

## 2024-08-21 DIAGNOSIS — E66.3 OVERWEIGHT (BMI 25.0-29.9): Primary | ICD-10-CM

## 2024-08-21 PROCEDURE — G8427 DOCREV CUR MEDS BY ELIG CLIN: HCPCS | Performed by: SURGERY

## 2024-08-21 PROCEDURE — 1036F TOBACCO NON-USER: CPT | Performed by: SURGERY

## 2024-08-21 PROCEDURE — G8417 CALC BMI ABV UP PARAM F/U: HCPCS | Performed by: SURGERY

## 2024-08-21 PROCEDURE — 99214 OFFICE O/P EST MOD 30 MIN: CPT | Performed by: SURGERY

## 2024-11-13 ENCOUNTER — CLINICAL DOCUMENTATION (OUTPATIENT)
Dept: BARIATRICS/WEIGHT MGMT | Age: 39
End: 2024-11-13

## 2024-11-13 NOTE — PROGRESS NOTES
This eval was conducted via phone on 11/13/24 in preparation or their post-surgical visit on 11/20/24 with Dr. Lopez.     Dietary Assessment Note      Vitals: Self-reported wt: 165 lbs Patient lost 13 lbs over 3 mos.    Total Weight Loss: 70 lbs    Labs reviewed: no lab studies available for review at time of visit    Protein intake: 60-80 grams/day Feels that some days she is short, so she continues shakes 1X/day    Fluid intake: 48-64 oz/day, decaf unsweet tea, gatorade protein water, sf koolaid    Multivitamin/mineral intake: yes womens one a day or Gummy MVI 2/day       Calcium intake: yes 2 chews     Other: Iron    Exercise:  walking, ADLs - slowing down with cooler weather, but plans to get treadmill      Nutrition Assessment: 9 mos post-op visit.   B: protein shake OR eggs  L: turkey breast + veggies (carrots/ g beans)  S: protein bars OR [employer] chips   D: fish + veggies   S: protein bars OR [employer] chips     Amount able to eat per sitting:  uses food scale 3-4 oz meat + 1/2 cup side     Following 30/30/30 rule: yes    Food Intolerances/issues: none    Client Concerns: none    Goals:   - Continue diet and exercise plan     Plan: f/u per provider    CHLOÉ BALLESTEROS, MS, RD, LD

## 2024-11-14 NOTE — PROGRESS NOTES
[facility]   General & Laparoscopic Surgery  [employer]       HPI:     Rhona Lockhart is a very pleasant 39 y.o. obese female , Body mass index is 27.7 kg/m².. And multiple medical problems who is presenting for bariatric follow up care.   Rhona Lockhart is s/p laparoscopic sleeve gastrectomy by me   Comes today to the clinic without any complaints. Patient denies any nausea, vomiting, fevers, chills, shortness of breath, chest pain, constipation or urinary symptoms. Denies any heartburn nor dysphagia.   Patient is feeling very well, and is very active. Patient is very pleased with the weight loss and resolution of co-morbid conditions.        Past Medical History:   Diagnosis Date    Anxiety     Arthritis     COVID-19     Depression     OCD (obsessive compulsive disorder)     Urinary incontinence      Past Surgical History:   Procedure Laterality Date    CHOLECYSTECTOMY      ENDOSCOPY, COLON, DIAGNOSTIC      SLEEVE GASTRECTOMY N/A 2/27/2024    ROBOTIC ASSISTED LAPAROSCOPIC SLEEVE GASTRECTOMY, HIATAL HERNIA REPAIR, LAPAROSCOPIC VENTRAL HERNIA REPAIR performed by Justice Lopez DO at [facility] OR    UPPER GASTROINTESTINAL ENDOSCOPY N/A 09/11/2023    EGD BIOPSY performed by Justice Lopez DO at [facility] ENDOSCOPY     Family History   Problem Relation Age of Onset    Diabetes Mother     Alcohol Abuse Paternal Grandfather     Mental Illness Brother     Depression Brother      Social History     Tobacco Use    Smoking status: Never     Passive exposure: Current (familly smokes in home)    Smokeless tobacco: Never    Tobacco comments:     Passive exposure, family smokes   Substance Use Topics    Alcohol use: Yes     Comment: very rarely, once a year     I counseled the patient on the importance of not smoking and risks of ETOH.   No Known Allergies  Vitals:    11/20/24 1225   BP: 97/67   Pulse: 96   Weight: 73.5 kg (162 lb)   Height: 1.629 m (5' 4.13\")       Body mass index is 27.7 kg/m².      Current

## 2024-11-20 ENCOUNTER — OFFICE VISIT (OUTPATIENT)
Dept: BARIATRICS/WEIGHT MGMT | Age: 39
End: 2024-11-20
Payer: COMMERCIAL

## 2024-11-20 VITALS
HEART RATE: 96 BPM | BODY MASS INDEX: 27.66 KG/M2 | WEIGHT: 162 LBS | HEIGHT: 64 IN | DIASTOLIC BLOOD PRESSURE: 67 MMHG | SYSTOLIC BLOOD PRESSURE: 97 MMHG

## 2024-11-20 DIAGNOSIS — E66.3 OVERWEIGHT (BMI 25.0-29.9): Primary | ICD-10-CM

## 2024-11-20 DIAGNOSIS — K21.9 CHRONIC GERD: ICD-10-CM

## 2024-11-20 PROCEDURE — G8417 CALC BMI ABV UP PARAM F/U: HCPCS | Performed by: SURGERY

## 2024-11-20 PROCEDURE — 99214 OFFICE O/P EST MOD 30 MIN: CPT | Performed by: SURGERY

## 2024-11-20 PROCEDURE — 1036F TOBACCO NON-USER: CPT | Performed by: SURGERY

## 2024-11-20 PROCEDURE — G8484 FLU IMMUNIZE NO ADMIN: HCPCS | Performed by: SURGERY

## 2024-11-20 PROCEDURE — G8427 DOCREV CUR MEDS BY ELIG CLIN: HCPCS | Performed by: SURGERY

## 2024-11-27 ENCOUNTER — OFFICE VISIT (OUTPATIENT)
Dept: ENT CLINIC | Age: 39
End: 2024-11-27
Payer: COMMERCIAL

## 2024-11-27 ENCOUNTER — PROCEDURE VISIT (OUTPATIENT)
Dept: AUDIOLOGY | Age: 39
End: 2024-11-27
Payer: COMMERCIAL

## 2024-11-27 VITALS
BODY MASS INDEX: 27.66 KG/M2 | HEIGHT: 64 IN | WEIGHT: 162 LBS | HEART RATE: 90 BPM | DIASTOLIC BLOOD PRESSURE: 67 MMHG | SYSTOLIC BLOOD PRESSURE: 109 MMHG

## 2024-11-27 DIAGNOSIS — H60.8X3 CHRONIC ECZEMATOUS OTITIS EXTERNA OF BOTH EARS: Primary | ICD-10-CM

## 2024-11-27 DIAGNOSIS — H92.03 OTALGIA OF BOTH EARS: Primary | ICD-10-CM

## 2024-11-27 DIAGNOSIS — H93.13 TINNITUS OF BOTH EARS: ICD-10-CM

## 2024-11-27 DIAGNOSIS — H92.01 RIGHT EAR PAIN: ICD-10-CM

## 2024-11-27 PROCEDURE — 92557 COMPREHENSIVE HEARING TEST: CPT | Performed by: AUDIOLOGIST

## 2024-11-27 PROCEDURE — 1036F TOBACCO NON-USER: CPT | Performed by: OTOLARYNGOLOGY

## 2024-11-27 PROCEDURE — G8484 FLU IMMUNIZE NO ADMIN: HCPCS | Performed by: OTOLARYNGOLOGY

## 2024-11-27 PROCEDURE — 99204 OFFICE O/P NEW MOD 45 MIN: CPT | Performed by: OTOLARYNGOLOGY

## 2024-11-27 PROCEDURE — G8417 CALC BMI ABV UP PARAM F/U: HCPCS | Performed by: OTOLARYNGOLOGY

## 2024-11-27 PROCEDURE — G8427 DOCREV CUR MEDS BY ELIG CLIN: HCPCS | Performed by: OTOLARYNGOLOGY

## 2024-11-27 PROCEDURE — 92567 TYMPANOMETRY: CPT | Performed by: AUDIOLOGIST

## 2024-11-27 RX ORDER — MOMETASONE FUROATE 1 MG/G
OINTMENT TOPICAL
Qty: 1 EACH | Refills: 0 | Status: SHIPPED | OUTPATIENT
Start: 2024-11-27

## 2024-11-27 ASSESSMENT — ENCOUNTER SYMPTOMS
VOICE CHANGE: 0
APNEA: 0
SHORTNESS OF BREATH: 0
FACIAL SWELLING: 0
SORE THROAT: 0
COUGH: 0
TROUBLE SWALLOWING: 0
EYE ITCHING: 0
SINUS PRESSURE: 0

## 2024-11-27 NOTE — PROGRESS NOTES
[facility]  [address], [address]  [place] [address]  P: [phone]       Patient     Rhona Lockhart  1985    ChiefComplaint     Chief Complaint   Patient presents with    New Patient    Ear Problem     Bilateral pain, itchy, ringing worse when lying down.  X 2 mo       History of Present Illness     Rhona is a 39-year-old female here today for bilateral itchy ears, intermittent tinnitus and right-sided ear pain.  States ears itch constantly and will have the sensation of drainage.  Notes ear pain when she lays the right ear on the pillow at night and she prefers to sleep on the right side.  Tinnitus present for years intermittent overall nonintrusive but does get loud sometimes.    Past Medical History     Past Medical History:   Diagnosis Date    Anxiety     Arthritis     COVID-19     Depression     OCD (obsessive compulsive disorder)     Urinary incontinence        Past Surgical History     Past Surgical History:   Procedure Laterality Date    CHOLECYSTECTOMY      ENDOSCOPY, COLON, DIAGNOSTIC      SLEEVE GASTRECTOMY N/A 2/27/2024    ROBOTIC ASSISTED LAPAROSCOPIC SLEEVE GASTRECTOMY, HIATAL HERNIA REPAIR, LAPAROSCOPIC VENTRAL HERNIA REPAIR performed by Justice Lopez DO at [facility] OR    UPPER GASTROINTESTINAL ENDOSCOPY N/A 09/11/2023    EGD BIOPSY performed by Justice Lopez DO at [facility] ENDOSCOPY       Family History     Family History   Problem Relation Age of Onset    Diabetes Mother     Alcohol Abuse Paternal Grandfather     Mental Illness Brother     Depression Brother        Social History     Social History     Tobacco Use    Smoking status: Never     Passive exposure: Current (familly smokes in home)    Smokeless tobacco: Never    Tobacco comments:     Passive exposure, family smokes   Vaping Use    Vaping status: Never Used   Substance Use Topics    Alcohol use: Yes     Comment: very rarely, once a year    Drug use: Never        Allergies     No Known Allergies    Medications

## 2024-11-27 NOTE — PROGRESS NOTES
Rhona Lockhart   1985, 39 y.o. female   [id number]       Referring Provider: Leonela Shah DO  Referral Type: In an order in Epic    Reason for Visit: Evaluation of suspected change in hearing and tinnitus    ADULT AUDIOLOGIC EVALUATION      Rhona Lochkart is a 39 y.o. female seen today, 11/27/2024 , for an initial audiologic evaluation.  Patient was seen by Leonela Shah DO following today's evaluation.     AUDIOLOGIC AND OTHER PERTINENT MEDICAL HISTORY:      Rhona Lockhart noted otalgia, bilaterally for the past month. She notes history of ear infections as a child-denies ear surgeries. Patient also reports intermittent tinnitus. No additional significant otologic or medical history was reported.     Rhona Lockhart denied otorrhea, dizziness, imbalance, history of falls, history of occupational/recreational noise exposure, history of head trauma, history of ear surgery, and family history of hearing loss.    Date: 11/27/2024     IMPRESSIONS:      Today's results revealed a symmetric high-frequency sensorineural hearing loss with excellent word recognition, bilaterally. Hearing aids not recommended at this time. Follow medical recommendations of Leonela Shah DO.    ASSESSMENT AND FINDINGS:     Otoscopy revealed: Clear ear canals bilaterally    RIGHT EAR:  Hearing Sensitivity: Within normal limits through 4kHz sloping to a mild loss at 8kHz   Speech Recognition Threshold: 15 dB HL  Word Recognition: Excellent 96%, based on NU-6 25-word list at 50 dBHL using recorded speech stimuli.    Tympanometry: Normal peak pressure and compliance, Type A tympanogram, consistent with normal middle ear function.    LEFT EAR:  Hearing Sensitivity: Within normal limits through 4kHz sloping to a mild loss at 8kHz   Speech Recognition Threshold: 20 dB HL  Word Recognition: Excellent 96%, based on NU-6 25-word list at 50 dBHL using recorded speech stimuli.    Tympanometry: Normal peak pressure and compliance, Type A tympanogram,

## 2025-03-03 ENCOUNTER — CLINICAL DOCUMENTATION (OUTPATIENT)
Dept: BARIATRICS/WEIGHT MGMT | Age: 40
End: 2025-03-03

## 2025-03-03 NOTE — PROGRESS NOTES
[facility]   General & Laparoscopic Surgery  [employer]       HPI:     Rhoan Lockhart is a very pleasant 40 y.o. obese female , Body mass index is 27.59 kg/m².. And multiple medical problems who is presenting for bariatric follow up care.   Rhona Lockhart is s/p laparoscopic sleeve gastrectomy by me   Comes today to the clinic without any complaints. Patient denies any nausea, vomiting, fevers, chills, shortness of breath, chest pain, constipation or urinary symptoms. Denies any heartburn nor dysphagia.   Patient is feeling very well, and is very active. Patient is very pleased with the weight loss and resolution of co-morbid conditions.        Past Medical History:   Diagnosis Date    Anxiety     Arthritis     COVID-19     Depression     OCD (obsessive compulsive disorder)     Urinary incontinence      Past Surgical History:   Procedure Laterality Date    CHOLECYSTECTOMY      ENDOSCOPY, COLON, DIAGNOSTIC      SLEEVE GASTRECTOMY N/A 2/27/2024    ROBOTIC ASSISTED LAPAROSCOPIC SLEEVE GASTRECTOMY, HIATAL HERNIA REPAIR, LAPAROSCOPIC VENTRAL HERNIA REPAIR performed by Justice Lopez DO at [facility] OR    UPPER GASTROINTESTINAL ENDOSCOPY N/A 09/11/2023    EGD BIOPSY performed by Justice Lopez DO at [facility] ENDOSCOPY     Family History   Problem Relation Age of Onset    Diabetes Mother     Alcohol Abuse Paternal Grandfather     Mental Illness Brother     Depression Brother      Social History     Tobacco Use    Smoking status: Never     Passive exposure: Current (familly smokes in home)    Smokeless tobacco: Never    Tobacco comments:     Passive exposure, family smokes   Substance Use Topics    Alcohol use: Yes     Comment: very rarely, once a year     I counseled the patient on the importance of not smoking and risks of ETOH.   No Known Allergies  Vitals:    03/05/25 1133   BP: 117/65   Pulse: 78   Weight: 73.5 kg (162 lb)   Height: 1.632 m (5' 4.25\")       Body mass index is 27.59 kg/m².      Current

## 2025-03-03 NOTE — PROGRESS NOTES
This eval was conducted via phone on 3/3/25 in preparation or their post-surgical visit on 3/5/25 with Dr. Lopez.     Dietary Assessment Note      Vitals: Self reported wt: 161 lbs  Patient lost 4 lbs over 4 mos.    Total Weight Loss: 74 lbs    Labs reviewed:    Latest Reference Range & Units 02/28/24 05:55   Sodium 136 - 145 mmol/L 133 (L)   (L): Data is abnormally low   Latest Reference Range & Units 02/28/24 05:55   BUN,BUNPL 7 - 20 mg/dL 6 (L)   (L): Data is abnormally low   Latest Reference Range & Units 02/28/24 05:55   Calcium 8.3 - 10.6 mg/dL 8.1 (L)   (L): Data is abnormally low    Protein intake: 60-80 grams/day     Fluid intake: 48-64 oz/day, water, CL, gatorade protein     Multivitamin/mineral intake:  Womens 1X/ day(no iron)/gummy MVI     Calcium intake: yes 2 chews     Other: Iron    Exercise:  ADLs, dancing - plans to get treadmill - hoping for skin removal to make movement      Nutrition Assessment: 1 year post-op visit. Not always feeling hungry  Brunch: Wakes at 11am - seafood salad  S: protein bars OR granola bar  S: protein bar OR granola bar OR 1/2 lf cream cheese + 1/2 english muffin  D: fish/turkey + pasta/brown rice + broccoli/asparagus/g beans/   S: seafood salad OR CC     Amount able to eat per sitting: uses food scale 3-4 oz meat + 1/2 cup side + 1/4 cup starch    Following 30/30/30 rule: yes    Food Intolerances/issues: none    Client Concerns: none     Goals:   - Continue diet plan - continue to focus on protein and produce   - Add in exercise as able with getting treadmill and improved weather    Plan: f/u per provider    CHLOÉ BALLESTEROS, MS, RD, LD

## 2025-03-05 ENCOUNTER — OFFICE VISIT (OUTPATIENT)
Dept: BARIATRICS/WEIGHT MGMT | Age: 40
End: 2025-03-05

## 2025-03-05 ENCOUNTER — INITIAL CONSULT (OUTPATIENT)
Dept: SURGERY | Age: 40
End: 2025-03-05

## 2025-03-05 VITALS
HEIGHT: 64 IN | DIASTOLIC BLOOD PRESSURE: 65 MMHG | BODY MASS INDEX: 27.66 KG/M2 | WEIGHT: 162 LBS | SYSTOLIC BLOOD PRESSURE: 117 MMHG | HEART RATE: 78 BPM

## 2025-03-05 VITALS
HEART RATE: 78 BPM | SYSTOLIC BLOOD PRESSURE: 117 MMHG | HEIGHT: 64 IN | WEIGHT: 162 LBS | DIASTOLIC BLOOD PRESSURE: 65 MMHG | BODY MASS INDEX: 27.66 KG/M2

## 2025-03-05 DIAGNOSIS — E66.3 OVERWEIGHT (BMI 25.0-29.9): ICD-10-CM

## 2025-03-05 DIAGNOSIS — E88.810 METABOLIC SYNDROME: Primary | ICD-10-CM

## 2025-03-05 DIAGNOSIS — N62 MACROMASTIA: ICD-10-CM

## 2025-03-05 DIAGNOSIS — K21.9 CHRONIC GERD: ICD-10-CM

## 2025-03-05 DIAGNOSIS — M79.3 PANNICULITIS: Primary | ICD-10-CM

## 2025-03-05 NOTE — PROGRESS NOTES
[facility]    Consultation requested by:  Krissy Bazan MD (initially); Justice Lopez DO    CC: Body contouring    HPI: 40 y.o. female with a PMHx as delineated below who presents in consultation for body contouring. She underwent a sleeve gastrectomy with Dr. Lopez (2/24) losing a total of 75 lbs.  Since that time, she notes that she is having issues with excess skin.  She notes that she is having issues with excess skin of her thighs. She also notes significant itching beneath her pannus.  She notes that there are rashes that have developed beneath her breasts and she has also developed upper back pain with the breast deflation. Given these issues, she was referred to plastic surgery for evaluation.    Bariatric surgery: Yes / date: 2/24 (Type: sleeve gastrectomy)    Max weight (lbs): 250  Lowest weight (lbs): 162  Current (lbs): 162  Weight change in the past 3 months: No  Max BMI: 43  Current BMI: 27.6    History of DVT/PE: No  Previous body contouring procedures: No   Smoking: No    Last Mammogram: None    Current bra size: Unsure (previously \"M cup\")  Desired bra size: As small as possible  Pregnancies/miscarriages: 0 / 0  Breast feeding: no future plans    Breast Symptoms:    Macromastia Symptoms:  Upper back pain: Yes      Bra strap grooves: Yes      Wears supportive bras (>1 yr): Yes      Tried conservative measures (PT, MDs,etc): Yes (Chiropractor)      Intertrigo: Yes      Head/neck pain: Yes      Headaches: Yes      Paresthesias of hands/fingers: Yes    Past Medical History:   Diagnosis Date    Anxiety     Arthritis     COVID-19     Depression     OCD (obsessive compulsive disorder)     Urinary incontinence      Past Surgical History:   Procedure Laterality Date    CHOLECYSTECTOMY      ENDOSCOPY, COLON, DIAGNOSTIC      SLEEVE GASTRECTOMY N/A 2/27/2024    ROBOTIC ASSISTED LAPAROSCOPIC SLEEVE GASTRECTOMY, HIATAL HERNIA REPAIR, LAPAROSCOPIC VENTRAL HERNIA REPAIR performed by Jessica

## 2025-03-06 ENCOUNTER — TELEPHONE (OUTPATIENT)
Dept: SURGERY | Age: 40
End: 2025-03-06

## 2025-03-06 RX ORDER — SODIUM CHLORIDE, SODIUM LACTATE, POTASSIUM CHLORIDE, CALCIUM CHLORIDE 600; 310; 30; 20 MG/100ML; MG/100ML; MG/100ML; MG/100ML
INJECTION, SOLUTION INTRAVENOUS CONTINUOUS
OUTPATIENT
Start: 2025-03-06

## 2025-03-06 RX ORDER — SODIUM CHLORIDE 9 MG/ML
INJECTION, SOLUTION INTRAVENOUS PRN
OUTPATIENT
Start: 2025-03-06

## 2025-03-06 RX ORDER — SODIUM CHLORIDE 0.9 % (FLUSH) 0.9 %
5-40 SYRINGE (ML) INJECTION EVERY 12 HOURS SCHEDULED
OUTPATIENT
Start: 2025-03-06

## 2025-03-06 RX ORDER — SODIUM CHLORIDE 0.9 % (FLUSH) 0.9 %
5-40 SYRINGE (ML) INJECTION PRN
OUTPATIENT
Start: 2025-03-06

## 2025-03-06 NOTE — TELEPHONE ENCOUNTER
The patient was in the office to see Dr. Epperson yesterday.    PLAN: Would benefit from breast amputation with free nipple grafting given the significant size of the breasts. The patient desires to be as small as possible and desires to proceed. Will submit to insurance and schedule     I received a surgery letter.    I spoke with the patient at the home number listed to discuss needed medical records. The patient is currently in pain management for back and neck pain. The patient has done PT a few times and has seen a chiropractor, but it was several years ago, so I did not request the records. The patient was provided the office fax number and aware that once I receive the records I will reach out to her.     I will leave this phone note open.

## 2025-04-04 NOTE — TELEPHONE ENCOUNTER
I received PT records via fax yesterday. The records are in my drawer. I spoke with the patient at the home number listed to discuss there records received. She stated that she is seeing her family doctor on the 10th. The patient is aware that I will reach out to her when I receive the family doctor records.     I will leave this phone note open.

## 2025-04-21 ENCOUNTER — TELEPHONE (OUTPATIENT)
Dept: SURGERY | Age: 40
End: 2025-04-21

## 2025-04-21 NOTE — TELEPHONE ENCOUNTER
Patient called in wondering if records were received from Dr. Orlando Bazan, her PCP?     When she called the office they said they would send records over today.

## 2025-04-21 NOTE — TELEPHONE ENCOUNTER
I received faxed medical records for the patient from her PCP. The office visit note is in my chart. I spoke with the patient at the home number listed to discuss the pain management records. She stated that she has them and will work to fax them to the office.     I will leave this phone note open.

## 2025-04-24 NOTE — TELEPHONE ENCOUNTER
I received faxed medical records for the patient from pain management. We reviewed all of the records that I have as of today. She is aware that I will submit the insurance today and call her once I receive feedback.     I will fax a [facility] Medical Prior Authorization Request Form, clinicals and colored pictures today. I will scan the information that I faxed and the fax success into Epic under the media tab, but I am not able to scan colored pictures.      I will leave this phone note open.

## 2025-05-01 ENCOUNTER — PREP FOR PROCEDURE (OUTPATIENT)
Dept: SURGERY | Age: 40
End: 2025-05-01

## 2025-05-01 DIAGNOSIS — N62 MACROMASTIA: ICD-10-CM

## 2025-05-01 NOTE — TELEPHONE ENCOUNTER
I received a fax from [facility] dated 5-1-2025. I will scan the fax into Epic under the media tab.    APPROVED  Authorization Number [id number]  CPT Code 42271.86000  Date Range 5-2-2025 to 8-  Zeenat Farrar    I spoke with the patient at the home number listed. The patient is now scheduled for surgery with  on 5-.     The patient is aware of H&P.    The patient is scheduled for her post op appointment 6-2-2025.    I will submit the case request to Charan tomas.     I will email the surgery information and instructions to the patient today [email or web address].     I will close this phone note.

## 2025-05-02 ENCOUNTER — TELEPHONE (OUTPATIENT)
Dept: SURGERY | Age: 40
End: 2025-05-02

## 2025-05-02 NOTE — TELEPHONE ENCOUNTER
I returned the patients call mentioned below. I sent the email to the address that the patient provided. She confirmed receiving the email.    I will close this phone note.

## 2025-05-02 NOTE — TELEPHONE ENCOUNTER
Patient called in stating that she did not receive her surgery instructions via email    Please email the surgery instructions to the patient: [email or web address].     Please contact the patient with any questions at [phone]

## 2025-05-02 NOTE — TELEPHONE ENCOUNTER
Spoke with patient and she stated she did not receive the email.     The email address it needs to be sent to is [email or web address]

## 2025-05-02 NOTE — TELEPHONE ENCOUNTER
I returned the patients call mentioned below. I lmom that I have emailed the information and please call the office if it is not received.     I will close this phone note.

## 2025-05-13 ENCOUNTER — OFFICE VISIT (OUTPATIENT)
Dept: SURGERY | Age: 40
End: 2025-05-13
Payer: COMMERCIAL

## 2025-05-13 DIAGNOSIS — N62 MACROMASTIA: Primary | ICD-10-CM

## 2025-05-13 PROCEDURE — G8427 DOCREV CUR MEDS BY ELIG CLIN: HCPCS

## 2025-05-13 PROCEDURE — G8417 CALC BMI ABV UP PARAM F/U: HCPCS

## 2025-05-13 PROCEDURE — 1036F TOBACCO NON-USER: CPT

## 2025-05-13 PROCEDURE — 99214 OFFICE O/P EST MOD 30 MIN: CPT

## 2025-05-13 NOTE — H&P (VIEW-ONLY)
[facility]    Consultation requested by:  Krissy Bazan MD (initially); Justice Lopez DO    CC: Body contouring    HPI: 40 y.o. female with a PMHx as delineated below who presents in consultation for body contouring. She underwent a sleeve gastrectomy with Dr. Lopez (2/24) losing a total of 75 lbs.  Since that time, she notes that she is having issues with excess skin.  She notes that she is having issues with excess skin of her thighs. She also notes significant itching beneath her pannus.  She notes that there are rashes that have developed beneath her breasts and she has also developed upper back pain with the breast deflation. Given these issues, she was referred to plastic surgery for evaluation.    Since her last evaluation she denies any new medical conditions or concerns. She presents today with her sister for pre-op and post-op education.     Bariatric surgery: Yes / date: 2/24 (Type: sleeve gastrectomy)    Max weight (lbs): 250  Lowest weight (lbs): 162  Current (lbs): 162  Weight change in the past 3 months: No  Max BMI: 43  Current BMI: 27.6    History of DVT/PE: No  Previous body contouring procedures: No   Smoking: No    Last Mammogram: None    Current bra size: Unsure (previously \"M cup\")  Desired bra size: As small as possible  Pregnancies/miscarriages: 0 / 0  Breast feeding: no future plans    Breast Symptoms:    Macromastia Symptoms:  Upper back pain: Yes      Bra strap grooves: Yes      Wears supportive bras (>1 yr): Yes      Tried conservative measures (PT, MDs,etc): Yes (Chiropractor)      Intertrigo: Yes      Head/neck pain: Yes      Headaches: Yes      Paresthesias of hands/fingers: Yes    Past Medical History:   Diagnosis Date    Anxiety     Arthritis     COVID-19     Depression     OCD (obsessive compulsive disorder)     Urinary incontinence      Past Surgical History:   Procedure Laterality Date    CHOLECYSTECTOMY      ENDOSCOPY, COLON, DIAGNOSTIC      SLEEVE

## 2025-05-21 RX ORDER — M-VIT,TX,IRON,MINS/CALC/FOLIC 27MG-0.4MG
1 TABLET ORAL DAILY
COMMUNITY

## 2025-05-21 RX ORDER — CALCIUM CARBONATE 500(1250)
500 TABLET ORAL DAILY
COMMUNITY

## 2025-05-21 NOTE — PROGRESS NOTES
5/21/2025 0925 AM:      PREP FOR PROCEDURE INSTRUCTIONS/TS:      5/21/205 0945 AM:    Telephone Interview (TI) Completed- including review of medications, allergies, medical history, need for ride home from family/friend/caregiver, needs caregiver for at least 24 hours after procedure,  notify surgeon office of any changes in health and follow all instructions from surgeon office/ts.       CALLED FOR H&P/LABS DONE AT [facility] [phone]

## 2025-05-21 NOTE — PROGRESS NOTES
5/21/2025 0945 AM:    PRESURGICAL BATHING INSTRUCTIONS  The [facility] takes many steps to prevent infections during surgery. One way is to provide   you with 4% Chlorhexidine Gluconate (CHG), a special antiseptic soap to wash your skin prior to   surgery. By thoroughly washing your skin, you can reduce the number of germs and help us   prevent an infection. Skin prep is a very important part of getting you ready for surgery. Please   follow the instructions listed below. Do NOT use if you have had an allergic reaction to CHG   previously.   Common Brand names:  Betasept, Hibiclens, Hibistat, Exidine, BioScrub, Freida-Hex, Peridex, Clorostat        Showering Steps  Before Your Procedure: Follow instructions from your surgeon for frequency of use prior to your surgery.     Wash your hair, face and body using your regular soap and shampoo.    Rinse your hair and body thoroughly to remove any soap or shampoo residue.  Turn the water off to prevent rinsing the antiseptic soap (CHG) off too soon.    Wash the body (neck down) gently for 5 minutes using a clean washcloth wet down with the CHG soap on it.    Apply the Chlorhexidine Gluconate (CHG) soap to your entire body only from the neck down. Use antibacterial soap for face and head (Dial or SafeGuard)  Do not use on your face, eyes, ears, hair or genital area to avoid permanent injury to those areas.  Do not scrub the skin too hard. Wash thoroughly paying special attention to the area   where the surgery or procedure will be done.   Do not wash body with regular soap once CHG is used.   Turn the water back on and rinse the body off thoroughly.  Pat yourself dry with a clean fresh towel after each shower..   Put on clean clothes after each shower.Do not put on lotions or powders after bathing.  Use antibacterial soap for face and head (Dial or SafeGuard). Do not use Chlorhexidine Gluconate (CHG) soap on face or head.   Follow instructions from surgeon for frequency

## 2025-05-21 NOTE — PROGRESS NOTES
[facility] PRE-SURGICAL TESTING INSTRUCTIONS                      PRIOR TO PROCEDURE DATE:    1. PLEASE FOLLOW ANY INSTRUCTIONS GIVEN TO YOU PER YOUR SURGEON, INCLUDING ARRIVAL TIME.      2. Arrange for someone to drive you home and be with you for the first 24 hours after discharge for your safety after your procedure for which you received sedation. Ensure it is someone we can share information with regarding your discharge. Contact surgeon's office for arrival/procedure time.     NOTE: At this time ONLY 2 ADULTS may accompany you. NO CHILDREN UNDER AGE OF 16.    One person ENCOURAGED to stay at hospital entire time if outpatient surgery      3. You must contact your surgeon for instructions IF:  You are taking any blood thinners, aspirin, anti-inflammatory or vitamins.   Contact your ordering physician/surgeon for prescription medication instructions as soon as possible, especially if taking blood thinners, aspirin, heart, or diabetic medication.   There is a change in your physical condition such as a cold, fever, rash, cuts, sores, or any other infection, especially near your surgical site.    4. Do not drink alcohol the day before or day of your procedure.  Do not use any marijuana at least 24 hours or street drugs (heroin, cocaine) at minimum 5 days prior to your procedure.     5. A Pre-Surgical History and Physical MUST be completed WITHIN 30 DAYS OR LESS prior to your procedure.by your Physician or an Urgent Care        THE DAY OF YOUR PROCEDURE:  1.  Follow instructions for ARRIVAL TIME as DIRECTED BY YOUR SURGEON. [place] [phone]     2. Enter the MAIN entrance from [facility] and follow the signs to the free Parking Garage or  Parking (offered free of charge 7 am-5pm).      3. Enter the Main Entrance of the hospital (do not enter from the lower level of the parking garage). Upon entrance, check in with the  at the surgical

## 2025-05-23 ENCOUNTER — ANESTHESIA EVENT (OUTPATIENT)
Dept: OPERATING ROOM | Age: 40
End: 2025-05-23
Payer: COMMERCIAL

## 2025-05-23 ENCOUNTER — ANESTHESIA (OUTPATIENT)
Dept: OPERATING ROOM | Age: 40
End: 2025-05-23
Payer: COMMERCIAL

## 2025-05-23 ENCOUNTER — HOSPITAL ENCOUNTER (OUTPATIENT)
Age: 40
Setting detail: OUTPATIENT SURGERY
Discharge: HOME | End: 2025-05-23
Attending: SURGERY | Admitting: SURGERY
Payer: COMMERCIAL

## 2025-05-23 VITALS
OXYGEN SATURATION: 98 % | BODY MASS INDEX: 28.85 KG/M2 | RESPIRATION RATE: 16 BRPM | HEART RATE: 88 BPM | TEMPERATURE: 97.8 F | WEIGHT: 169 LBS | SYSTOLIC BLOOD PRESSURE: 115 MMHG | DIASTOLIC BLOOD PRESSURE: 76 MMHG | HEIGHT: 64 IN

## 2025-05-23 DIAGNOSIS — G89.18 ACUTE POST-OPERATIVE PAIN: Primary | ICD-10-CM

## 2025-05-23 DIAGNOSIS — N62 MACROMASTIA: ICD-10-CM

## 2025-05-23 LAB — HCG UR QL: NEGATIVE

## 2025-05-23 PROCEDURE — C1729 CATH, DRAINAGE: HCPCS | Performed by: SURGERY

## 2025-05-23 PROCEDURE — 6360000002 HC RX W HCPCS: Performed by: NURSE ANESTHETIST, CERTIFIED REGISTERED

## 2025-05-23 PROCEDURE — 84703 CHORIONIC GONADOTROPIN ASSAY: CPT

## 2025-05-23 PROCEDURE — 2580000003 HC RX 258: Performed by: SURGERY

## 2025-05-23 PROCEDURE — 97605 NEG PRS WND THER DME<=50SQCM: CPT | Performed by: SURGERY

## 2025-05-23 PROCEDURE — 7100000011 HC PHASE II RECOVERY - ADDTL 15 MIN: Performed by: SURGERY

## 2025-05-23 PROCEDURE — 88305 TISSUE EXAM BY PATHOLOGIST: CPT

## 2025-05-23 PROCEDURE — 7100000001 HC PACU RECOVERY - ADDTL 15 MIN: Performed by: SURGERY

## 2025-05-23 PROCEDURE — 19318 BREAST REDUCTION: CPT | Performed by: SURGERY

## 2025-05-23 PROCEDURE — 2720000010 HC SURG SUPPLY STERILE: Performed by: SURGERY

## 2025-05-23 PROCEDURE — 2709999900 HC NON-CHARGEABLE SUPPLY: Performed by: SURGERY

## 2025-05-23 PROCEDURE — 3600000004 HC SURGERY LEVEL 4 BASE: Performed by: SURGERY

## 2025-05-23 PROCEDURE — 3700000000 HC ANESTHESIA ATTENDED CARE: Performed by: SURGERY

## 2025-05-23 PROCEDURE — 2500000003 HC RX 250 WO HCPCS: Performed by: SURGERY

## 2025-05-23 PROCEDURE — 3600000014 HC SURGERY LEVEL 4 ADDTL 15MIN: Performed by: SURGERY

## 2025-05-23 PROCEDURE — L8000 MASTECTOMY BRA: HCPCS | Performed by: SURGERY

## 2025-05-23 PROCEDURE — 6360000002 HC RX W HCPCS: Performed by: SURGERY

## 2025-05-23 PROCEDURE — 2500000003 HC RX 250 WO HCPCS: Performed by: NURSE ANESTHETIST, CERTIFIED REGISTERED

## 2025-05-23 PROCEDURE — 7100000010 HC PHASE II RECOVERY - FIRST 15 MIN: Performed by: SURGERY

## 2025-05-23 PROCEDURE — 19350 NIPPLE/AREOLA RECONSTRUCTION: CPT | Performed by: SURGERY

## 2025-05-23 PROCEDURE — 7100000000 HC PACU RECOVERY - FIRST 15 MIN: Performed by: SURGERY

## 2025-05-23 PROCEDURE — 6360000002 HC RX W HCPCS: Performed by: ANESTHESIOLOGY

## 2025-05-23 PROCEDURE — 3700000001 HC ADD 15 MINUTES (ANESTHESIA): Performed by: SURGERY

## 2025-05-23 RX ORDER — ONDANSETRON 2 MG/ML
4 INJECTION INTRAMUSCULAR; INTRAVENOUS
Status: COMPLETED | OUTPATIENT
Start: 2025-05-23 | End: 2025-05-23

## 2025-05-23 RX ORDER — KETAMINE HCL IN NACL, ISO-OSM 20 MG/2 ML
SYRINGE (ML) INJECTION
Status: DISCONTINUED | OUTPATIENT
Start: 2025-05-23 | End: 2025-05-23 | Stop reason: SDUPTHER

## 2025-05-23 RX ORDER — PROPOFOL 10 MG/ML
INJECTION, EMULSION INTRAVENOUS
Status: DISCONTINUED | OUTPATIENT
Start: 2025-05-23 | End: 2025-05-23 | Stop reason: SDUPTHER

## 2025-05-23 RX ORDER — CEPHALEXIN 500 MG/1
500 CAPSULE ORAL 4 TIMES DAILY
Qty: 40 CAPSULE | Refills: 0 | Status: SHIPPED | OUTPATIENT
Start: 2025-05-23 | End: 2025-06-02

## 2025-05-23 RX ORDER — OXYCODONE HYDROCHLORIDE 5 MG/1
5 TABLET ORAL EVERY 6 HOURS PRN
Qty: 28 TABLET | Refills: 0 | Status: SHIPPED | OUTPATIENT
Start: 2025-05-23 | End: 2025-05-30

## 2025-05-23 RX ORDER — SODIUM CHLORIDE 0.9 % (FLUSH) 0.9 %
5-40 SYRINGE (ML) INJECTION PRN
Status: DISCONTINUED | OUTPATIENT
Start: 2025-05-23 | End: 2025-05-23 | Stop reason: HOSPADM

## 2025-05-23 RX ORDER — SODIUM CHLORIDE, SODIUM LACTATE, POTASSIUM CHLORIDE, CALCIUM CHLORIDE 600; 310; 30; 20 MG/100ML; MG/100ML; MG/100ML; MG/100ML
INJECTION, SOLUTION INTRAVENOUS CONTINUOUS
Status: DISCONTINUED | OUTPATIENT
Start: 2025-05-23 | End: 2025-05-23 | Stop reason: HOSPADM

## 2025-05-23 RX ORDER — LIDOCAINE HYDROCHLORIDE 10 MG/ML
1 INJECTION, SOLUTION EPIDURAL; INFILTRATION; INTRACAUDAL; PERINEURAL
Status: DISCONTINUED | OUTPATIENT
Start: 2025-05-23 | End: 2025-05-23 | Stop reason: HOSPADM

## 2025-05-23 RX ORDER — SODIUM CHLORIDE 0.9 % (FLUSH) 0.9 %
5-40 SYRINGE (ML) INJECTION EVERY 12 HOURS SCHEDULED
Status: DISCONTINUED | OUTPATIENT
Start: 2025-05-23 | End: 2025-05-23 | Stop reason: HOSPADM

## 2025-05-23 RX ORDER — LIDOCAINE HYDROCHLORIDE 20 MG/ML
INJECTION, SOLUTION INTRAVENOUS
Status: DISCONTINUED | OUTPATIENT
Start: 2025-05-23 | End: 2025-05-23 | Stop reason: SDUPTHER

## 2025-05-23 RX ORDER — HYDROMORPHONE HYDROCHLORIDE 1 MG/ML
0.5 INJECTION, SOLUTION INTRAMUSCULAR; INTRAVENOUS; SUBCUTANEOUS EVERY 5 MIN PRN
Status: DISCONTINUED | OUTPATIENT
Start: 2025-05-23 | End: 2025-05-23 | Stop reason: HOSPADM

## 2025-05-23 RX ORDER — METHOCARBAMOL 100 MG/ML
INJECTION, SOLUTION INTRAMUSCULAR; INTRAVENOUS
Status: DISCONTINUED | OUTPATIENT
Start: 2025-05-23 | End: 2025-05-23 | Stop reason: SDUPTHER

## 2025-05-23 RX ORDER — DOCUSATE SODIUM 100 MG/1
100 CAPSULE, LIQUID FILLED ORAL 2 TIMES DAILY
Qty: 30 CAPSULE | Refills: 0 | Status: SHIPPED | OUTPATIENT
Start: 2025-05-23

## 2025-05-23 RX ORDER — IPRATROPIUM BROMIDE AND ALBUTEROL SULFATE 2.5; .5 MG/3ML; MG/3ML
1 SOLUTION RESPIRATORY (INHALATION)
Status: DISCONTINUED | OUTPATIENT
Start: 2025-05-23 | End: 2025-05-23 | Stop reason: HOSPADM

## 2025-05-23 RX ORDER — ROCURONIUM BROMIDE 10 MG/ML
INJECTION, SOLUTION INTRAVENOUS
Status: DISCONTINUED | OUTPATIENT
Start: 2025-05-23 | End: 2025-05-23 | Stop reason: SDUPTHER

## 2025-05-23 RX ORDER — PROCHLORPERAZINE EDISYLATE 5 MG/ML
5 INJECTION INTRAMUSCULAR; INTRAVENOUS
Status: DISCONTINUED | OUTPATIENT
Start: 2025-05-23 | End: 2025-05-23 | Stop reason: HOSPADM

## 2025-05-23 RX ORDER — FENTANYL CITRATE 50 UG/ML
25 INJECTION, SOLUTION INTRAMUSCULAR; INTRAVENOUS EVERY 5 MIN PRN
Status: DISCONTINUED | OUTPATIENT
Start: 2025-05-23 | End: 2025-05-23 | Stop reason: HOSPADM

## 2025-05-23 RX ORDER — ACETAMINOPHEN 325 MG/1
650 TABLET ORAL
Status: DISCONTINUED | OUTPATIENT
Start: 2025-05-23 | End: 2025-05-23 | Stop reason: HOSPADM

## 2025-05-23 RX ORDER — SODIUM CHLORIDE 9 MG/ML
INJECTION, SOLUTION INTRAVENOUS PRN
Status: DISCONTINUED | OUTPATIENT
Start: 2025-05-23 | End: 2025-05-23 | Stop reason: HOSPADM

## 2025-05-23 RX ORDER — METOCLOPRAMIDE HYDROCHLORIDE 5 MG/ML
INJECTION INTRAMUSCULAR; INTRAVENOUS
Status: DISCONTINUED | OUTPATIENT
Start: 2025-05-23 | End: 2025-05-23 | Stop reason: SDUPTHER

## 2025-05-23 RX ORDER — LABETALOL HYDROCHLORIDE 5 MG/ML
10 INJECTION, SOLUTION INTRAVENOUS
Status: DISCONTINUED | OUTPATIENT
Start: 2025-05-23 | End: 2025-05-23 | Stop reason: HOSPADM

## 2025-05-23 RX ORDER — MAGNESIUM HYDROXIDE 1200 MG/15ML
LIQUID ORAL CONTINUOUS PRN
Status: DISCONTINUED | OUTPATIENT
Start: 2025-05-23 | End: 2025-05-23 | Stop reason: HOSPADM

## 2025-05-23 RX ORDER — FAMOTIDINE 10 MG/ML
INJECTION, SOLUTION INTRAVENOUS
Status: DISCONTINUED | OUTPATIENT
Start: 2025-05-23 | End: 2025-05-23 | Stop reason: SDUPTHER

## 2025-05-23 RX ORDER — ONDANSETRON 2 MG/ML
INJECTION INTRAMUSCULAR; INTRAVENOUS
Status: DISCONTINUED | OUTPATIENT
Start: 2025-05-23 | End: 2025-05-23 | Stop reason: SDUPTHER

## 2025-05-23 RX ORDER — NALOXONE HYDROCHLORIDE 0.4 MG/ML
INJECTION, SOLUTION INTRAMUSCULAR; INTRAVENOUS; SUBCUTANEOUS PRN
Status: DISCONTINUED | OUTPATIENT
Start: 2025-05-23 | End: 2025-05-23 | Stop reason: HOSPADM

## 2025-05-23 RX ADMIN — WATER 2000 MG: 1 INJECTION INTRAMUSCULAR; INTRAVENOUS; SUBCUTANEOUS at 10:56

## 2025-05-23 RX ADMIN — METOCLOPRAMIDE 10 MG: 5 INJECTION, SOLUTION INTRAMUSCULAR; INTRAVENOUS at 10:52

## 2025-05-23 RX ADMIN — SUGAMMADEX 200 MG: 100 INJECTION, SOLUTION INTRAVENOUS at 13:04

## 2025-05-23 RX ADMIN — HYDROMORPHONE HYDROCHLORIDE 2 MG: 1 INJECTION, SOLUTION INTRAMUSCULAR; INTRAVENOUS; SUBCUTANEOUS at 10:49

## 2025-05-23 RX ADMIN — TRANEXAMIC ACID 1000 MG: 1 INJECTION, SOLUTION INTRAVENOUS at 10:57

## 2025-05-23 RX ADMIN — Medication 20 MG: at 10:49

## 2025-05-23 RX ADMIN — FAMOTIDINE 20 MG: 10 INJECTION, SOLUTION INTRAVENOUS at 10:46

## 2025-05-23 RX ADMIN — PROPOFOL 50 MG: 10 INJECTION, EMULSION INTRAVENOUS at 12:39

## 2025-05-23 RX ADMIN — ONDANSETRON 4 MG: 2 INJECTION, SOLUTION INTRAMUSCULAR; INTRAVENOUS at 14:33

## 2025-05-23 RX ADMIN — PROPOFOL 180 MG: 10 INJECTION, EMULSION INTRAVENOUS at 10:49

## 2025-05-23 RX ADMIN — SODIUM CHLORIDE, SODIUM LACTATE, POTASSIUM CHLORIDE, AND CALCIUM CHLORIDE: .6; .31; .03; .02 INJECTION, SOLUTION INTRAVENOUS at 13:04

## 2025-05-23 RX ADMIN — PHENYLEPHRINE HYDROCHLORIDE 50 MCG: 10 INJECTION, SOLUTION INTRAVENOUS at 10:56

## 2025-05-23 RX ADMIN — ONDANSETRON 8 MG: 2 INJECTION INTRAMUSCULAR; INTRAVENOUS at 10:46

## 2025-05-23 RX ADMIN — SODIUM CHLORIDE, SODIUM LACTATE, POTASSIUM CHLORIDE, AND CALCIUM CHLORIDE: .6; .31; .03; .02 INJECTION, SOLUTION INTRAVENOUS at 11:56

## 2025-05-23 RX ADMIN — ROCURONIUM BROMIDE 100 MG: 10 INJECTION, SOLUTION INTRAVENOUS at 10:50

## 2025-05-23 RX ADMIN — METHOCARBAMOL 1000 MG: 100 INJECTION, SOLUTION INTRAMUSCULAR; INTRAVENOUS at 10:53

## 2025-05-23 RX ADMIN — LIDOCAINE HYDROCHLORIDE 100 MG: 20 INJECTION, SOLUTION INTRAVENOUS at 10:49

## 2025-05-23 RX ADMIN — SODIUM CHLORIDE, SODIUM LACTATE, POTASSIUM CHLORIDE, AND CALCIUM CHLORIDE: .6; .31; .03; .02 INJECTION, SOLUTION INTRAVENOUS at 09:07

## 2025-05-23 ASSESSMENT — PAIN DESCRIPTION - DESCRIPTORS: DESCRIPTORS: ACHING

## 2025-05-23 ASSESSMENT — PAIN SCALES - GENERAL: PAINLEVEL_OUTOF10: 0

## 2025-05-23 NOTE — PROGRESS NOTES
PACU Transfer to [facility] # 5    Procedure(s):  Bilateral breast amputation with free nipple grafts    Dr Epperson    Current Allergies: Patient has no known allergies.    Pt meets criteria as per Luis Carlos Score and ASPAN Standards to transfer to next phase of care.     No results for input(s): \"POCGLU\" in the last 72 hours.    Vitals:    05/23/25 1430   BP: 103/72   Pulse: 97   Resp: 14   Temp: 97.9 °F (36.6 °C)   SpO2: 93%     SpO2: 93 %    O2 Flow Rate (L/min): 0 L/min      Intake/Output Summary (Last 24 hours) at 5/23/2025 1446  Last data filed at 5/23/2025 1440  Gross per 24 hour   Intake 2325 ml   Output 550 ml   Net 1775 ml     Tolerating ice chips  Drank water  Became nauseated after up to bathroom to void  Received peppermint oil, bolus of fluid and Zofran   feeling better    Pain assessment:  none    Patient was assessed for alterations to skin integrity. There were not alterations observed.    Negative wound vac and KELLI tubes clean dry intact  Surgical bra on    Handoff report given via handoff sheet  Family updated and directed to pt room via waiting room staff  Transported by Tracy CLEMENTE      5/23/2025 2:46 PM

## 2025-05-23 NOTE — PROGRESS NOTES
Ambulatory Surgery/Procedure Discharge Note    Vitals:    05/23/25 1447   BP: 115/76   Pulse: 88   Resp: 16   Temp: 97.8 °F (36.6 °C)   SpO2: 98%       In: 2325 [P.O.:220; I.V.:2045]  Out: 565 [Urine:450; Drains:15]    Restroom use offered before discharge.  Yes    Pain assessment:  level of pain (1-10, 10 severe),   Pain Level: 0    Patient discharged to home with father, patient having no complaints of nausea.      Patient discharged to home/self care. Patient discharged via wheel chair by transporter to waiting family/S.O.       5/23/2025 4:20 PM

## 2025-05-23 NOTE — ANESTHESIA POSTPROCEDURE EVALUATION
Department of Anesthesiology  Postprocedure Note    Patient: Rhona Lockhart  MRN: [id number]  YOB: 1985  Date of evaluation: 5/23/2025    Procedure Summary       Date: 05/23/25 Room / Location: [address]    Anesthesia Start: 1045 Anesthesia Stop: 1319    Procedure: Bilateral breast amputation with free nipple grafts (Bilateral: Breast) Diagnosis:       Macromastia      (Macromastia [N62])    Surgeons: Casandra Epperson MD Responsible Provider: Terry Barajas MD    Anesthesia Type: general ASA Status: 3            Anesthesia Type: No value filed.    Luis Carlos Phase I: Luis Carlos Score: 10    Luis Carlos Phase II: Luis Carlos Score: 10    Anesthesia Post Evaluation    Patient location during evaluation: PACU  Patient participation: complete - patient participated  Level of consciousness: awake  Pain score: 2  Airway patency: patent  Cardiovascular status: blood pressure returned to baseline  Respiratory status: acceptable  Hydration status: euvolemic  Pain management: adequate    No notable events documented.

## 2025-05-23 NOTE — BRIEF OP NOTE
Brief Postoperative Note      Patient: Rhona Lockhart  YOB: 1985  MRN: [id number]    Date of Procedure: 5/23/2025    Pre-Op Diagnosis Codes:      * Macromastia [N62]    Post-Op Diagnosis: Same       Procedure(s):  Bilateral breast amputation with free nipple grafts    Surgeon(s):  Casandra Epperson MD Kundu, Neilendu, MD Kundu, Neilendu, MD    Assistant:  Surgical Assistant: Linda Li  Resident: Cameron Castillo DO    Anesthesia: General    Estimated Blood Loss (mL): less than 100     Complications: None    Specimens:   ID Type Source Tests Collected by Time Destination   A : RIGHT BREAST TISSUE Tissue Tissue SURGICAL PATHOLOGY Casandra Epperson MD 5/23/2025 1118    B : LEFT BREAST TISSUE Tissue Tissue SURGICAL PATHOLOGY Casandra Epperson MD 5/23/2025 1134        Implants:  * No implants in log *      Drains:   Closed/Suction Drain Inferior;Lateral;Right Breast Bulb (Active)   Site Description Clean, dry & intact 05/23/25 1233   Dressing Status New dressing applied 05/23/25 1233   Drain Status To bulb suction 05/23/25 1233       Closed/Suction Drain Inferior;Lateral;Left Breast Bulb (Active)   Site Description Clean, dry & intact 05/23/25 1243   Dressing Status New dressing applied 05/23/25 1243   Drain Status To bulb suction 05/23/25 1243       Negative Pressure Wound Therapy Breast Right (Active)       Negative Pressure Wound Therapy Breast Left (Active)       Findings:  Infection Present At Time Of Surgery (PATOS) (choose all levels that have infection present):  No infection present  Other Findings:   Bilateral breast amputation with free nipple graft. Right breast 988g removed, left breast 854g removed. Two KELLI drains placed. Kiya wound vacuum for dressing.     Electronically signed by Cameron Castillo DO on 5/23/2025 at 12:58 PM

## 2025-05-23 NOTE — DISCHARGE INSTRUCTIONS
Diet:   May resume regular diet    Wound Care:   You have a Prevena wound vacuum over your incision site. Please ensure that this stays to proper suction until your follow up. You should be sent home with a charging kit as well as an instruction manual for the device. If you have any trouble or questions about the kit please call  [phone]    You have been given a bra assist in the support of your incisions. Please wear this consistently for 48 hours following surgery. Please continue wearing the bra until your follow up appointment with Dr. Epperson     You have 2 KELLI drains. Please record the output for these drains daily and bring the record to your follow up appointment.     Activity:   No heavy lifting greater than a milk jug, or 8 lbs until follow up.    Pain management:   For moderate to severe pain please take the Roxicodone that you were prescribed. If you take narcotics, note that they may cause constipation. For constipation take Colace up to two times a day as needed. If stools become loose, you may reduce the amount of colace you are taking or stop taking all together. Take as little narcotic pain medication as you can tolerate. No driving while on narcotics.    For mild to moderate pain you may take over-the-counter Tylenol or Motrin as directed on the packaging. Do not take more than 4000 mg acetaminophen (the active ingredient in tylenol) per day.     Bowel Regimen:   Opioid/Narcotic pain relievers have a common side effect of constipation; therefore, you have been provided with a prescription for a stool softener, Docusate (Colace).  These medications are intended to help prevent you from experiencing this very common side effect and also help to regulate your bowels after surgery.   If your stools become too loose and/or frequent, decrease the Colace to one pill one time each day. If your stools are still loose after this modification, stop taking this medication all together.    Antibiotic:  You  below:    DIET INSTRUCTIONS:  [x]Start with light diet and progress to your normal diet as you feel like eating. If you experience nausea or repeated episodes of vomiting which persist beyond 12-24 hours, notify your doctor.     ACTIVITY INSTRUCTIONS:  [x]Rest today. Increase activity as tolerated    [x]No heavy lifting or strenuous activity. Nothing over 8 lbs (full gallon of milk) until cleared by the doctor.     [x]No driving for 24 hours or while taking narcotic pain medication  [x]Weight bearing: []none /  []partial / [x]full as tolerated/[] toe touch/[]heel touch   []Other     WOUND/DRESSING INSTRUCTIONS:       Always ensure you and your care giver clean hands before and after caring for the wound.      [x]May Sponge bathe                                                   [x]Keep dressing dry            [x]Do not remove dressing                     []Wear bra continuously for 24-36 hours until you see the doctor  []    MEDICATION INSTRUCTIONS:  [x]Prescription(s) x   3  sent home with you to get filled at your pharmacy.  Use as directed.  When taking pain medications, you may experience dizziness or drowsiness.  Do not drink alcohol or drive when taking these medications.  [x] Patient identity verified using 2 identifiers.  You may have been prescribed an anti-biotic to take at home, finish all of it.      [x]You may take a non-prescription “headache remedy”, preferably one that does not contain aspirin. Do not exceed 4000 mg of acetaminophen/ Tylenol in  24 hours. Be aware that your prescription may contain acetaminophen.    [x]Narcotic pain medications can cause significant constipation.  You may want to add a stool softener to your postoperative medication schedule or speak to your surgeon on how best to manage this side effect.    If you have a CPAP machine, it is very important that you use it daily during all periods of sleep and daytime rest during your recovery at home.  Surgery and Anesthesia place a

## 2025-05-23 NOTE — OP NOTE
[facility] PLASTIC & RECONSTRUCTIVE SURGERY     OPERATIVE DICTATION    NAME: Rhona Lockhart   MRN: [id number]  DATE: 5/23/2025     AGE: 40 y.o.    PREOPERATIVE DIAGNOSIS:  Macromastia.     POSTOPERATIVE DIAGNOSIS:  Macromastia.     OPERATION PERFORMED:  1. Bilateral breast amputation.        2. Bilateral free nipple graft application       3. Application of Kiya incisional wound vacuum device    SURGEON:  Casandra Epperson MD     ASSISTANT:  Cameron Castillo (PGY3), Linda Li (SA).     ANESTHESIA:  General.     ESTIMATED BLOOD LOSS:  50 mL.     DRAINS:  2 (1 each breast).     SPECIMENS:  Breast tissue (right breast 988 gm, left breast 854 gm).     OPERATIVE INDICATIONS:  This is an 40 y.o.-year-old female with a history of symptomatic macromastia.  She desired breast reduction to alleviate her symptoms and her exam revealed severe macromastia with best yield of breast amputation. The risks, benefits, alternatives, outcomes, and personnel involved with the aforementioned procedure were discussed in detail with the patient.  After all questions were answered in a satisfactory manner, she agreed to proceed.     OPERATIVE PROCEDURE:  The patient was marked in the preoperative holding area and then brought to the operating room, and placed in supine position on the operating room table.  After satisfactory induction of general endotracheal anesthesia, the patient was then prepped and draped in the usual sterile manner.  Time-out was performed confirming the patient and the procedure to be performed.  The operation commenced via excising the nipples and placing them on a moistened gauze on the back table. Rachel pattern incisions were then incised on both breasts.  Beginning with the right breast, the breast tissue was amputated from the inferior aspect of the breast.  The breast was then tailor tacked and the same procedure was mirrored on the contralateral left breast. The patient was then tailor-tacked, sat up to check

## 2025-05-23 NOTE — ANESTHESIA PRE PROCEDURE
Department of Anesthesiology  Preprocedure Note       Name:  Rhona Lockhart   Age:  40 y.o.  :  1985                                          MRN:  [id number]         Date:  2025      Surgeon: Surgeon(s):  Casandra Epperson MD    Procedure: Procedure(s):  Bilateral breast amputation with free nipple grafts    Medications prior to admission:   Prior to Admission medications    Medication Sig Start Date End Date Taking? Authorizing Provider   Multiple Vitamins-Minerals (THERAPEUTIC MULTIVITAMIN-MINERALS) tablet Take 1 tablet by mouth daily   Yes Alea Malone MD   calcium carbonate (OSCAL) 500 MG TABS tablet Take 1 tablet by mouth daily   Yes Alea Malone MD   mometasone (ELOCON) 0.1 % ointment Apply topically daily x 2 weeks then 1-2 times per week 24  Yes Leonela Shah DO   potassium chloride (KLOR-CON) 8 MEQ extended release tablet Take 1 tablet by mouth daily   Yes Alea Malone MD   acetaminophen (TYLENOL) 500 MG tablet Take 2 tablets by mouth every 6 hours as needed for Pain   Yes Alea Malone MD   FIBER-VITAMINS-MINERALS PO Take 1 tablet by mouth daily   Yes Alea Malone MD   hydrOXYzine HCl (ATARAX) 10 MG tablet TAKE ONE TABLET THREE TIMES A DAY AS NEEDED FOR ITCHING 23  Yes Alea Malone MD   pregabalin (LYRICA) 100 MG capsule Take 150 mg by mouth 2 times daily. 23  Yes Alea Malone MD   HYDROcodone-acetaminophen (NORCO) 5-325 MG per tablet TAKE 1/2 TABLET BY MOUTH TWO TIMES A DAY AS DIRECTED 3/18/24   Alea Malone MD       Current medications:    Current Facility-Administered Medications   Medication Dose Route Frequency Provider Last Rate Last Admin   • lidocaine PF 1 % injection 1 mL  1 mL IntraDERmal Once PRN Anthony Brown MD       • lactated ringers infusion   IntraVENous Continuous Anthony Brown MD       • sodium chloride flush 0.9 % injection 5-40 mL  5-40 mL IntraVENous 2 times per day Kevin

## 2025-05-23 NOTE — INTERVAL H&P NOTE
Update History & Physical    The patient's History and Physical of May 13, 2025 was reviewed with the patient and I examined the patient. There was no change. The surgical site was confirmed by the patient and me.     Plan: The risks, benefits, expected outcome, and alternative to the recommended procedure have been discussed with the patient. Patient understands and wants to proceed with the procedure.     Electronically signed by Jerry Meehan DO on 5/23/2025 at 8:19 AM

## 2025-05-23 NOTE — PROGRESS NOTES
Patient having complaints of nausea, iv fluids going, small sips of water encouraged.  Will continue to monitor.

## 2025-05-23 NOTE — PROGRESS NOTES
Bilateral breast amputation with free nipple grafts     Dr Epperson    Current Allergies: Patient has no known allergies.    No results for input(s): \"POCGLU\" in the last 72 hours.    Admitted to PACU bed 13 from OR. Arrived on a stretcher.  Patient to be discharged to home.      Attached to PACU monitoring system. Alarms and parameters set.   Report received from anesthesia personnel. Young crna  OR staff did not report skin issues that were observed while in OR, or if admitted with skin issue.  No problems reported intraoperatively.  Pt arrived with oxygen per nasal cannula with oxygen at 6 liters.  Athrombic wraps in place.     Surgical site to the bilateral breast.  Closed with wound vac and surgical bra    Has full sensation bilateral arms, pulses strong.    Doctors aware of all labs and diagnostics before coming to recovery.

## 2025-05-27 ENCOUNTER — TELEPHONE (OUTPATIENT)
Dept: SURGERY | Age: 40
End: 2025-05-27

## 2025-05-27 NOTE — TELEPHONE ENCOUNTER
Patient is day 4 s/p Bilateral breast amputation with free nipple grafts. Patient reports feeling well. Pain is well controlled. Atbx. and pain meds are well tolerated. Patient is wearing compression garments as directed. Denies fever, nausea or chills. Post op restrictions reinforced. Kiya Wound Vac in place, functional. Bilateral breast Drains in place, output as expected. Patient reports itching and mild rash. Educated on zyrtec / pepcid protocol. Patient also takes hydroxyzine and can continue to do so but be advised for increased drowsiness.   Reminded of follow up visit 6/2/25. Patient will contact the office with any concerns.

## 2025-05-29 ENCOUNTER — TELEPHONE (OUTPATIENT)
Dept: SURGERY | Age: 40
End: 2025-05-29

## 2025-05-29 NOTE — TELEPHONE ENCOUNTER
Spoke to patient, denies SOB, chest pain. Negative for tiffanie's sign bilaterally. Swelling is non-pitting, non-weeping per patient description. Patient has had this issue periodically prior to weight loss. Advised to consult with PCP before resuming diuretic that had been previously d/c'd. Advised to wear compression stockings if available, walk to encourage perfusion, and elevate legs while at rest. Avoid salt and caffeine. If SOB, chest pain, calf pain, redness or increased swelling occurs, patient advised to go to [facility] ED. Patient verbalized understanding.

## 2025-05-29 NOTE — TELEPHONE ENCOUNTER
Patient called stating she is having swelling in her feet. She has been keeping them elevated but wants to know if it is okay to take furosemide.    Patient can be reached at: [phone]

## 2025-06-02 ENCOUNTER — OFFICE VISIT (OUTPATIENT)
Dept: SURGERY | Age: 40
End: 2025-06-02

## 2025-06-02 VITALS
BODY MASS INDEX: 26.98 KG/M2 | DIASTOLIC BLOOD PRESSURE: 97 MMHG | WEIGHT: 158 LBS | SYSTOLIC BLOOD PRESSURE: 150 MMHG | HEIGHT: 64 IN | HEART RATE: 73 BPM

## 2025-06-02 DIAGNOSIS — Z09 POSTOP CHECK: Primary | ICD-10-CM

## 2025-06-02 PROCEDURE — 99024 POSTOP FOLLOW-UP VISIT: CPT | Performed by: SURGERY

## 2025-06-02 NOTE — PROGRESS NOTES
[facility]    PROCEDURE: . 1. Bilateral breast amputation.                             2. Bilateral free nipple graft application                              3. Application of Kiya incisional wound vacuum device  DATE: 5/23/25    Rhona Lockhart has been recovering well since her procedure. Pain has been well controlled without pain medications.     EXAM    BP (!) 150/97   Pulse 73   Ht 1.626 m (5' 4\")   Wt 71.7 kg (158 lb)   LMP 05/05/2025   BMI 27.12 kg/m²      GEN: NAD   BREAST: Incisions healing well  Nipple grafts appear viable  No hematoma/seroma  Drains serosang    IMP: 40 y.o.female s/p breast amputation  PLAN: Vacs and drains removed.  She is very happy with her results thus far and will follow-up in 1 month.     Sarabjit Epperson MD  [facility] Plastic & Reconstructive Surgery  [phone]  06/02/25

## 2025-06-10 ENCOUNTER — TELEPHONE (OUTPATIENT)
Dept: SURGERY | Age: 40
End: 2025-06-10

## 2025-06-10 NOTE — TELEPHONE ENCOUNTER
Patient called in requesting a call from a nurse to ask some questions about her nipple healing.     Denies any drainage, or pain at this time.

## 2025-06-11 ENCOUNTER — OFFICE VISIT (OUTPATIENT)
Dept: SURGERY | Age: 40
End: 2025-06-11

## 2025-06-11 VITALS
HEIGHT: 64 IN | DIASTOLIC BLOOD PRESSURE: 77 MMHG | WEIGHT: 164.6 LBS | HEART RATE: 111 BPM | BODY MASS INDEX: 28.1 KG/M2 | SYSTOLIC BLOOD PRESSURE: 130 MMHG

## 2025-06-11 DIAGNOSIS — Z09 POSTOP CHECK: Primary | ICD-10-CM

## 2025-06-11 NOTE — PROGRESS NOTES
[facility]    PROCEDURE: . 1. Bilateral breast amputation.                             2. Bilateral free nipple graft application                              3. Application of Kiya incisional wound vacuum device  DATE: 5/23/25    Rhona Lockhart has been recovering well since her procedure. Pain has been well controlled without pain medications. Patient presents today for nipple graft wound check. She denies any fevers or chills.     EXAM    /77   Pulse (!) 111   Ht 1.626 m (5' 4\")   Wt 74.7 kg (164 lb 9.6 oz)   LMP 05/05/2025   BMI 28.25 kg/m²       GEN: NAD   BREAST: Incisions healing well  Nipple grafts appear viable  No hematoma/seroma     IMP: 40 y.o.female s/p breast amputation  PLAN: Overall doing well. No concerns seen with nipple grafts. She will continue xeroform and bacitracin daily. She will follow up as scheduled. She will call with any additional concerns in the interim.     Marycarmen Baird, APRN-CNP  [facility] Plastic & Reconstructive Surgery  [phone]  6/11/25

## 2025-06-17 ENCOUNTER — OFFICE VISIT (OUTPATIENT)
Dept: SURGERY | Age: 40
End: 2025-06-17

## 2025-06-17 ENCOUNTER — TELEPHONE (OUTPATIENT)
Dept: SURGERY | Age: 40
End: 2025-06-17

## 2025-06-17 VITALS
BODY MASS INDEX: 28.07 KG/M2 | WEIGHT: 164.4 LBS | HEART RATE: 89 BPM | SYSTOLIC BLOOD PRESSURE: 132 MMHG | HEIGHT: 64 IN | DIASTOLIC BLOOD PRESSURE: 76 MMHG

## 2025-06-17 DIAGNOSIS — Z09 POSTOP CHECK: Primary | ICD-10-CM

## 2025-06-17 RX ORDER — SULFAMETHOXAZOLE AND TRIMETHOPRIM 800; 160 MG/1; MG/1
1 TABLET ORAL 2 TIMES DAILY
Qty: 20 TABLET | Refills: 0 | Status: SHIPPED | OUTPATIENT
Start: 2025-06-17 | End: 2025-06-27

## 2025-06-17 NOTE — TELEPHONE ENCOUNTER
Patient called in stating that she is having pain near where her KELLI drain sites were. Patient states that she noticed a lump near where the drain site on her left side was. Patient states that both sites are red. She has noticed some bloody discharge from the left side drain site. Patient states that she thinks a stitch has come out on by her right side drain site.    Please contact the patient at [phone]

## 2025-06-17 NOTE — PROGRESS NOTES
[facility]    PROCEDURE: . 1. Bilateral breast amputation.                             2. Bilateral free nipple graft application                              3. Application of Kiya incisional wound vacuum device  DATE: 5/23/25    Rhona Lockhart has been recovering satisfactorily since her procedure. Pain has been well controlled without pain medications. Patient presents today for concerns of left breast swelling and erythema. She denies fevers or chills. She does states she has some discomfort at her previous left breast drain site as well.     EXAM    /76   Pulse 89   Ht 1.626 m (5' 4\")   Wt 74.6 kg (164 lb 6.4 oz)   LMP 05/05/2025   BMI 28.22 kg/m²       GEN: NAD   BREAST: Incisions healing well with exception of noticeable swelling of left breast and some erythema surrounding lower quadrant of breast.   Nipple grafts appear viable  No hematoma/seroma   IMP: 40 y.o.female s/p breast amputation  PLAN: We will begin patient on oral antibiotics. We will continue local wound care. She will call us with update Friday. If swelling has not improved we will order a left breast ultrasound. Otherwise she will follow up next week to ensure healing. She will call with any additional concerns in the interim.     Marycarmen Baird, APRN-CNP  [facility] Plastic & Reconstructive Surgery  [phone]  6/17/25

## 2025-06-17 NOTE — TELEPHONE ENCOUNTER
Spoke to patient, she is having redness and discomfort at previous maddie drain sites. Also describing possible breast wound dehiscence. Will see patient in office today at 2pm.

## 2025-06-25 ENCOUNTER — OFFICE VISIT (OUTPATIENT)
Dept: SURGERY | Age: 40
End: 2025-06-25

## 2025-06-25 VITALS
SYSTOLIC BLOOD PRESSURE: 102 MMHG | TEMPERATURE: 97.3 F | BODY MASS INDEX: 28.15 KG/M2 | OXYGEN SATURATION: 98 % | DIASTOLIC BLOOD PRESSURE: 69 MMHG | HEART RATE: 90 BPM | WEIGHT: 164 LBS

## 2025-06-25 DIAGNOSIS — Z09 POSTOP CHECK: Primary | ICD-10-CM

## 2025-06-26 NOTE — PROGRESS NOTES
[facility]    PROCEDURE: . 1. Bilateral breast amputation.                             2. Bilateral free nipple graft application                              3. Application of Kiya incisional wound vacuum device  DATE: 5/23/25    Rhona Lockhart has been recovering satisfactorily since her procedure. Pain has been well controlled without pain medications. Patient presents today for concerns for wound check of left breast.      EXAM    /69   Pulse 90   Temp 97.3 °F (36.3 °C)   Wt 74.4 kg (164 lb)   SpO2 98%   BMI 28.15 kg/m²       GEN: NAD   BREAST: Incisions healing well, swelling/erythema of left breast has improved. Nipple grafts appear viable  No hematoma/seroma   IMP: 40 y.o.female s/p breast amputation  PLAN: Doing better. She will continue to monitor symptoms. She will follow up with Dr. Epperson as scheduled.  She will call with any additional concerns in the interim.     Marycarmen Baird, APRN-CNP  [facility] Plastic & Reconstructive Surgery  [phone]  6/25/25

## 2025-07-07 ENCOUNTER — OFFICE VISIT (OUTPATIENT)
Dept: SURGERY | Age: 40
End: 2025-07-07

## 2025-07-07 VITALS
RESPIRATION RATE: 16 BRPM | HEART RATE: 75 BPM | DIASTOLIC BLOOD PRESSURE: 68 MMHG | BODY MASS INDEX: 27.33 KG/M2 | OXYGEN SATURATION: 100 % | SYSTOLIC BLOOD PRESSURE: 96 MMHG | WEIGHT: 159.2 LBS

## 2025-07-07 DIAGNOSIS — Z09 POSTOP CHECK: Primary | ICD-10-CM

## 2025-07-07 PROCEDURE — 99024 POSTOP FOLLOW-UP VISIT: CPT | Performed by: SURGERY

## 2025-07-07 NOTE — PROGRESS NOTES
[facility]    PROCEDURE: . 1. Bilateral breast amputation.                             2. Bilateral free nipple graft application                              3. Application of Kiya incisional wound vacuum device  DATE: 5/23/25    Rhona Lockhart has been recovering well since her procedure. Pain has been well controlled without pain medications.     EXAM    BP 96/68 (BP Site: Left Upper Arm, Patient Position: Sitting, BP Cuff Size: Medium Adult)   Pulse 75   Resp 16   Wt 72.2 kg (159 lb 3.2 oz)   SpO2 100%   BMI 27.33 kg/m²      GEN: NAD   BREAST: Incisions healing well (L>R size)  Nipple grafts viable  No hematoma/seroma      IMP: 40 y.o.female s/p breast amputation  PLAN: Doing well overall and she is happy with her results as her symptoms of macromastia have improved dramatically. She will follow-up in 6 months.    Sarabjit Epperson MD  [facility] Plastic & Reconstructive Surgery  [phone]  07/07/25

## (undated) DEVICE — GAUZE FLUFF 1 PLY: Brand: DEROYAL

## (undated) DEVICE — CADIERE FORCEPS: Brand: ENDOWRIST

## (undated) DEVICE — SYRINGE MED 10ML TRNSLUC BRL PLUNG BLK MRK POLYPR CTRL

## (undated) DEVICE — TROCAR: Brand: KII FIOS FIRST ENTRY

## (undated) DEVICE — BINDER ABD H12IN FOR 62-74IN WAIST UNIV 4 PNL PREM DSGN E

## (undated) DEVICE — SOLUTION ANTIFOG VIS SYS CLEARIFY LAPSCP

## (undated) DEVICE — BINDER ABD UNIV H9IN WAIST 45-62IN E SFT COT PREM 3 PNL

## (undated) DEVICE — GLOVE SURG SZ 75 L12IN THK75MIL DK GRN LTX FREE

## (undated) DEVICE — GLOVE ORANGE PI 7   MSG9070

## (undated) DEVICE — TRAP FLUID

## (undated) DEVICE — PLASMABLADE PS210-030S 3.0S LOCK: Brand: PLASMABLADE™

## (undated) DEVICE — GARMENT,MEDLINE,DVT,INT,CALF,MED, GEN2: Brand: MEDLINE

## (undated) DEVICE — CANNULA SAMP CO2 AD GRN 7FT CO2 AND 7FT O2 TBNG UNIV CONN

## (undated) DEVICE — BOWL MED L 32OZ PLAS W/ MOLD GRAD EZ OPN PEEL PCH

## (undated) DEVICE — VESSEL SEALER EXTEND: Brand: ENDOWRIST

## (undated) DEVICE — SYRINGE MED 10ML SLIP TIP BLNT FILL AND LUERLOCK DISP

## (undated) DEVICE — 3M™ STERI-STRIP™ BLEND TONE SKIN CLOSURES, B1557, TAN, 1/2 IN X 4 IN (12MM X 100MM), 6 STRIPS/ENVELOPE: Brand: 3M™ STERI-STRIP™

## (undated) DEVICE — LAPAROSCOPIC SCISSORS: Brand: EPIX LAPAROSCOPIC SCISSORS

## (undated) DEVICE — LIQUIBAND RAPID ADHESIVE 36/CS 0.8ML: Brand: MEDLINE

## (undated) DEVICE — PROGRASP FORCEPS: Brand: ENDOWRIST

## (undated) DEVICE — LARGE NEEDLE DRIVER: Brand: ENDOWRIST

## (undated) DEVICE — INSUFFLATION NEEDLE TO ESTABLISH PNEUMOPERITONEUM.: Brand: INSUFFLATION NEEDLE

## (undated) DEVICE — ARM DRAPE

## (undated) DEVICE — STAPLER SKIN H3.9MM WIRE DIA0.58MM CRWN 6.9MM 35 STPL ROT

## (undated) DEVICE — PUMP SUC IRR TBNG L10FT W/ HNDPC ASSEMB STRYKEFLOW 2

## (undated) DEVICE — SYRINGE MED 10ML LUERLOCK TIP W/O SFTY DISP

## (undated) DEVICE — TOWEL,STOP FLAG GOLD N-W: Brand: MEDLINE

## (undated) DEVICE — GLOVE ORANGE PI 8 1/2   MSG9085

## (undated) DEVICE — 40583 XL ADVANCED TRENDELENBURG POSITIONING KIT: Brand: 40583 XL ADVANCED TRENDELENBURG POSITIONING KIT

## (undated) DEVICE — Z DISC USE 2764362 SEAL ENDOSCP INSTR DIA5-8MM UNIV FOR CANN DA VINCI XI

## (undated) DEVICE — BRA SURG SUPP LG 38-40 IN ZIPPER

## (undated) DEVICE — Device

## (undated) DEVICE — TUBING, SUCTION, 1/4" X 12', STRAIGHT: Brand: MEDLINE

## (undated) DEVICE — APPLIER LIG CLP M L11IN TI STR RNG HNDL FOR 20 CLP DISP

## (undated) DEVICE — TOWEL,OR,DSP,ST,BLUE,DLX,8/PK,10PK/CS: Brand: MEDLINE

## (undated) DEVICE — GLOVE ORANGE PI 7 1/2   MSG9075

## (undated) DEVICE — SEALANT TISS 4 CC FIBRIN VISTASEAL

## (undated) DEVICE — STAPLER 60 RELOAD BLUE: Brand: SUREFORM

## (undated) DEVICE — DEVICE CLSR 10/12MM XL PRT SYS SUT PASS ST DISP CARTER

## (undated) DEVICE — DRAIN SURG 15FR L3 16IN DIA47MM SIL RND HUBLESS FULL FLUT

## (undated) DEVICE — SYSTEM SKIN CLOSURE 42CM DERMABOND PRINEO

## (undated) DEVICE — SUTURE ETHBND EXCEL SZ 0 L30IN NONABSORBABLE GRN L26MM SH X834H

## (undated) DEVICE — SUTURE VICRYL + SZ 0 L18IN ABSRB UD L36MM CT-1 1/2 CIR VCP840D

## (undated) DEVICE — BLADELESS OBTURATOR, LONG: Brand: WECK VISTA

## (undated) DEVICE — PAD FOAM 11.75X7-7/8 IN RESTON LF

## (undated) DEVICE — SUTURE CTD ANTBCTRL 54 IN TI VCRL PLU VLT

## (undated) DEVICE — NEGATIVE PRESSURE THERAPY KIT 24X22 CM PREVENA RESTOR FRM

## (undated) DEVICE — SUTURE ABSORBABLE MONOFILAMENT 2-0 SH 6 IN STRATAFIX SPRL SXPP1B415

## (undated) DEVICE — FORCEPS BX L240CM JAW DIA2.8MM L CAP W/ NDL MIC MESH TOOTH

## (undated) DEVICE — SUTURE MONOCRYL SZ 3-0 L27IN ABSRB UD PS-2 3/8 CIR REV CUT NDL MCP427H

## (undated) DEVICE — GARMENT,MEDLINE,DVT,INT,CALF,LG, GEN2: Brand: MEDLINE

## (undated) DEVICE — STAPLER 60 RELOAD GREEN: Brand: SUREFORM

## (undated) DEVICE — 60 ML SYRINGE,CATHETER TIP: Brand: MONOJECT

## (undated) DEVICE — AGENT HEMSTAT 3GM OXIDIZED REGENERATED CELOS ABSRB FOR CONT (ORDER MULTIPLES OF 5EA)

## (undated) DEVICE — INTENDED USE FOR SURGICAL MARKING ON INTACT SKIN, ALSO PROVIDES A PERMANENT METHOD OF IDENTIFYING OBJECTS IN THE OPERATING ROOM: Brand: WRITESITE® PLUS MINI PREP RESISTANT MARKER

## (undated) DEVICE — AGENT HEMSTAT W2XL4IN OXIDIZED REGENERATED CELOS ABSRB

## (undated) DEVICE — AGENT HEMOSTATIC SURG ORIGINAL ABS 2X14IN LOOSE KNIT 12/BX

## (undated) DEVICE — 3M™ TEGADERM™ CHG CHLORHEXIDINE GLUCONATE GEL PAD 1664, 25 EACH/CARTON, 4 CARTONS/CASE: Brand: 3M™ TEGADERM™

## (undated) DEVICE — STAPLER 60: Brand: SUREFORM

## (undated) DEVICE — 1010 S-DRAPE TOWEL DRAPE 10/BX: Brand: STERI-DRAPE™

## (undated) DEVICE — BLANKET WRM W40.2XL55.9IN IORT LO BODY + MISTRAL AIR

## (undated) DEVICE — SEAL

## (undated) DEVICE — PLASTIC MAJOR: Brand: MEDLINE INDUSTRIES, INC.

## (undated) DEVICE — SUTURE VCRL SZ 4-0 L18IN ABSRB UD L19MM PS-2 3/8 CIR PRIM J496H

## (undated) DEVICE — SYSTEM SMK EVAC LAP TBNG FILTER HSNG BENT STYL PNK SEE CLR

## (undated) DEVICE — ROBOTIC: Brand: MEDLINE INDUSTRIES, INC.

## (undated) DEVICE — SUTURE MONOCRYL STRATAFIX SPRL + SZ 3-0 L18IN ABSRB UD PS-2 SXMP1B107

## (undated) DEVICE — VISIGI 3D®  CALIBRATION SYSTEM  SIZE 36FR STD W/ BULB: Brand: BOEHRINGER® VISIGI 3D™ SLEEVE GASTRECTOMY CALIBRATION SYSTEM, SIZE 36FR W/BULB

## (undated) DEVICE — SYRINGE IRRIG 60ML SFT PLIABLE BLB EZ TO GRP 1 HND USE W/

## (undated) DEVICE — SYSTEM WND THER 14 DAY 150 CC CANSTR THER UNIT PREVENA + 125